# Patient Record
Sex: MALE | Race: WHITE | NOT HISPANIC OR LATINO | Employment: FULL TIME | URBAN - METROPOLITAN AREA
[De-identification: names, ages, dates, MRNs, and addresses within clinical notes are randomized per-mention and may not be internally consistent; named-entity substitution may affect disease eponyms.]

---

## 2017-03-28 ENCOUNTER — GENERIC CONVERSION - ENCOUNTER (OUTPATIENT)
Dept: OTHER | Facility: OTHER | Age: 32
End: 2017-03-28

## 2017-03-30 ENCOUNTER — ALLSCRIPTS OFFICE VISIT (OUTPATIENT)
Dept: OTHER | Facility: OTHER | Age: 32
End: 2017-03-30

## 2017-12-05 ENCOUNTER — ALLSCRIPTS OFFICE VISIT (OUTPATIENT)
Dept: OTHER | Facility: OTHER | Age: 32
End: 2017-12-05

## 2017-12-12 NOTE — PROGRESS NOTES
Assessment    1  Acute upper respiratory infection (465 9) (J06 9)   2  Depression (311) (F32 9)    Plan  Acute upper respiratory infection    · Zithromax Z-Mickey 250 MG Oral Tablet (Azithromycin); TAKE 2 TABLETS ON DAY 1THEN TAKE 1 TABLET A DAY FOR 4 DAYS  Depression    · Escitalopram Oxalate 20 MG Oral Tablet   · Venlafaxine HCl ER 75 MG Oral Tablet Extended Release 24 Hour; ONE TABDAILY    Discussion/Summary    Start effexor for depressionantibiotics for uriof fluids and rest       Chief Complaint  Patient presents today for a cough and congestion  History of Present Illness  HPI: since last thursday with cough with dark brown phlegmcongestion         Review of Systems   Constitutional: not feeling poorly-- and-- not feeling tired  ENT: nasal discharge, but-- no earache,-- no nosebleeds-- and-- no hearing loss  Cardiovascular: the heart rate was not slow,-- no chest pain,-- the heart rate was not fast-- and-- no palpitations  Respiratory: no shortness of breath,-- no cough,-- no wheezing-- and-- no shortness of breath during exertion  Gastrointestinal: no abdominal pain,-- no nausea,-- no constipation-- and-- no diarrhea  Genitourinary: no urinary hesitancy  Musculoskeletal: no arthralgias,-- no joint swelling,-- no myalgias-- and-- no joint stiffness  Active Problems  1  Abscess of groin, left (682 2) (L02 214)   2  Acute streptococcal pharyngitis (034 0) (J02 0)   3  Acute upper respiratory infection (465 9) (J06 9)   4  Depression (311) (F32 9)   5  Fatigue (780 79) (R53 83)   6  Hyperlipidemia (272 4) (E78 5)   7  Need for prophylactic vaccination and inoculation against influenza (V04 81) (Z23)   8  Palpitations (785 1) (R00 2)   9  Seasonal allergies (477 9) (J30 2)   10  Seborrheic dermatitis (690 10) (L21 9)   11  Snoring (786 09) (R06 83)   12  STD exposure (V01 6) (Z20 2)   13  Tachycardia (785 0) (R00 0)    Past Medical History  Active Problems And Past Medical History Reviewed:    The active problems and past medical history were reviewed and updated today  Surgical History  Surgical History Reviewed: The surgical history was reviewed and updated today  Social History     · Being A Social Drinker   · Denied: History of Drug Use   · Denied: Exercising Regularly   · Non-smoker (V49 89) (Z78 9)  The social history was reviewed and updated today  The social history was reviewed and is unchanged  Family History  Family History Reviewed: The family history was reviewed and updated today  Current Meds   1  Escitalopram Oxalate 20 MG Oral Tablet; TAKE 1 TABLET DAILY; Therapy: 97XFY3512 to (Evaluate:25Jan2018)  Requested for: 27Oct2017; Last Rx:27Oct2017 Ordered   2  Fexofenadine HCl - 180 MG Oral Tablet; TAKE 1 TABLET DAILY AS NEEDED FOR ALLERGIES; Therapy: 90PRZ1587 to (Evaluate:19Jan2017)  Requested for: 89JME4236; Last Rx:59Hzd3250 Ordered   3  Flonase Allergy Relief 50 MCG/ACT Nasal Suspension; 2 SPRAYS NASALLY AT BEDTIME AS NEEDED; Therapy: 26XIU8911 to (Evaluate:21Oct2016)  Requested for: 95PPS5727; Last Rx:80Dye3375 Ordered   4  Ketoconazole 2 % External Shampoo; APPLY AS DIRECTED; Therapy: 77KLJ0307 to (Jaquan North)  Requested for: 70ZPK2388; Last Rx:13Pya0181 Ordered   5  Venlafaxine HCl ER 37 5 MG Oral Tablet Extended Release 24 Hour; Therapy: 16NUK0732 to Recorded    The medication list was reviewed and updated today  Allergies  1  Bactrim DS TABS  2  No Known Environmental Allergies   3  No Known Food Allergies    Vitals   Recorded: 40FYS6221 11:21AM   Heart Rate 88   Respiration 18   Systolic 046   Diastolic 70   Height 5 ft 8 5 in   Weight 168 lb 0 2 oz   BMI Calculated 25 17   BSA Calculated 1 91   O2 Saturation 99       Physical Exam   Constitutional  General appearance: No acute distress, well appearing and well nourished  Eyes  Conjunctiva and lids: No swelling, erythema, or discharge  Pupils and irises: Equal, round and reactive to light  Ears, Nose, Mouth, and Throat  External inspection of ears and nose: Normal    Otoscopic examination: Tympanic membrance translucent with normal light reflex  Canals patent without erythema  Nasal mucosa, septum, and turbinates: Normal without edema or erythema  Oropharynx: Normal with no erythema, edema, exudate or lesions  Pulmonary  Respiratory effort: No increased work of breathing or signs of respiratory distress  Auscultation of lungs: Clear to auscultation, equal breath sounds bilaterally, no wheezes, no rales, no rhonci  Cardiovascular  Palpation of heart: Normal PMI, no thrills  Auscultation of heart: Normal rate and rhythm, normal S1 and S2, without murmurs  Examination of extremities for edema and/or varicosities: Normal    Carotid pulses: Normal    Abdomen  Abdomen: Non-tender, no masses  Liver and spleen: No hepatomegaly or splenomegaly  Lymphatic  Palpation of lymph nodes in neck: No lymphadenopathy  Musculoskeletal  Gait and station: Normal    Digits and nails: Normal without clubbing or cyanosis  Inspection/palpation of joints, bones, and muscles: Normal    Skin  Skin and subcutaneous tissue: Normal without rashes or lesions  Neurologic  Cranial nerves: Cranial nerves 2-12 intact  Reflexes: 2+ and symmetric  Sensation: No sensory loss  Psychiatric  Orientation to person, place and time: Normal    Mood and affect: Normal          Results/Data  PHQ-9 Adult Depression Screening 35CKY5198 11:20AM User, s     Test Name Result Flag Reference   PHQ-9 Adult Depression Score 4       Over the last two weeks, how often have you been bothered by any of the following problems?  Little interest or pleasure in doing things: Not at all - 0 Feeling down, depressed, or hopeless: Several days - 1 Trouble falling or staying asleep, or sleeping too much: Not at all - 0 Feeling tired or having little energy: Several days - 1 Poor appetite or over eating: Not at all - 0 Feeling bad about yourself - or that you are a failure or have let yourself or your family down: Not at all - 0 Trouble concentrating on things, such as reading the newspaper or watching television: Several days - 1 Moving or speaking so slowly that other people could have noticed  Or the opposite -  being so fidgety or restless that you have been moving around a lot more than usual: Not at all - 0 Thoughts that you would be better off dead, or of hurting yourself in some way: Several days - 1   PHQ-9 Adult Depression Screening Negative     PHQ-9 Difficulty Level Somewhat difficult     PHQ-9 Severity Minimal Depression           Message  Return to work or school:   Sapphire Russell is under my professional care  He was seen in my office on 12/5/17   He is able to return to work on  12/6/17      [employer]        Signatures   Electronically signed by : Deandra Durand; Dec 11 2017  9:33AM EST                       (Author)    Electronically signed by : MK Ross ; Dec 11 2017 11:07AM EST                       (Review)

## 2018-01-10 NOTE — PROGRESS NOTES
Assessment    1  Encounter for preventive health examination (V70 0) (Z00 00)   2  STD exposure (V01 6) (Z20 2)   3  Hyperlipidemia (272 4) (E78 5)     Assessment and plan #1 annual wellness examination completed per patient overall the patient is clinically stable and doing very well we encouraged the patient Griselda Freud healthy balanced diet, I would like the patient exercises routinely  I will be ordering a fasting operative metabolic panel and lipid panel  #2 history of hyperlipidemia we'll check lipid profile  #3 STD exposure will check acute hepatitis panel, urine chlamydia/gonorrhea DNA, HIV for generation combination and RPR  RTO 3 months call if any problems  Plan  Hyperlipidemia    · (1) COMPREHENSIVE METABOLIC PANEL; Status:Active; Requested for:12May2016;    · (1) LIPID PANEL, FASTING; Status:Active; Requested for:12May2016;   STD exposure    · (1) ACUTE HEPATITIS PANEL; Status:Active; Requested for:12May2016;    · (1) CHLAMYDIA/GC AMPLIFIED DNA, PCR; Source:Urine, Unspecified Source;  Status:Active; Requested for:12May2016;    · (1) HIV AG/AB COMBO, 4TH GEN; [Do Not Release]; Status:Active; Requested  for:12May2016;    · (1) RPR; Status:Active; Requested for:12May2016; History of Present Illness  HM, Adult Male: The patient is being seen for a health maintenance evaluation  Social History: He is unmarried  Work status: working full time and occupation:   The patient has never smoked cigarettes  He reports occasional alcohol use and drinking 1 time a week drinks per week  The patient has no concerns about alcohol abuse  He has never used illicit drugs  General Health: The patient's health since the last visit is described as good  He has regular dental visits  The patient brushes 1-2 time(s) a day, flosses 1 time(s) a day and reports his last dental visit was 12/2015  He complains of vision problems   Vision care includes wearing glasses, wearing reading glasses, an eye examination more than a year ago and 7/2015  He denies hearing loss  Hearing is normal  Immunizations status: up to date  Lifestyle:  He consumes a diverse and healthy diet  Dietary details include 1-2 servings of fruit per day, 1 servings of vegetables per day, 1 servings of meat per day, 0 servings of whole grains per day, 32 ounces of water per day, 1 servings of dairy foods per day, 1 cups of coffee per day, 1 cups of tea per day and 1 a week cans of regular soda per day  He does not have any weight concerns  He does not exercise regularly  He does not use tobacco  The patient has never smoked cigarettes, has never used smokeless tobacco, has never smoked a pipe and has never smoked cigars  He consumes alcohol  He reports occasional alcohol use and drinking 1-2 drinks per week  He typically drinks wine and hard liquor, but no beer consumption  Alcohol concern: The patient has no concerns about alcohol abuse  He denies drug use  He has never used illicit drugs  Reproductive health:  the patient is sexually active  He denies erectile dysfunction  Screening:     Safety elements used: seat belt, safe driving habits, sunscreen and smoke detector, but no carbon monoxide detector and no CPR training for the patient  Risk findings: sees a therapist several times per, but no passive smoke exposure, no guns at home, no anxiety symptoms, no depression symptoms, no travel to developing areas and no tuberculosis exposure  HPI: The patient does report to me that he is homosexual and he has had several partners      Active Problems    1  Abscess of groin, left (682 2) (L02 214)   2  Acute streptococcal pharyngitis (034 0) (J02 0)   3  Acute upper respiratory infection (465 9) (J06 9)   4  Fatigue (780 79) (R53 83)   5  Hyperlipidemia (272 4) (E78 5)   6  Need for prophylactic vaccination and inoculation against influenza (V04 81) (Z23)   7  Palpitations (785 1) (R00 2)   8  Seborrheic dermatitis (690 10) (L21 9)   9   Snoring (786 09) (R06 83)   10  Tachycardia (785 0) (R00 0)    Surgical History    · History of Ear Surgery    Family History  Father    · Family history of Diabetes Mellitus (V18 0)  Maternal Grandfather    · Family history of Colon Cancer (V16 0)  Paternal Grandfather    · Family history of Coronary Artery Disease (V17 49)    Social History    · Being A Social Drinker   · Denied: History of Drug Use   · Denied: Exercising Regularly   · Non-smoker (V49 89) (Z78 9)    Current Meds   1  Amoxicillin 500 MG Oral Capsule; TAKE 1 CAPSULE 3 TIMES DAILY UNTIL GONE;   Therapy: 61BQG1617 to (Shirlene January)  Requested for: 48WNZ5691; Last   Rx:18Mar2016 Ordered   2  Ketoconazole 2 % External Shampoo; APPLY AS DIRECTED; Therapy: 92BSH3480 to (Evaluate:09Jan2013)  Requested for: 41Uou8685; Last   Rx:51Syv2551 Ordered    Allergies    1  Bactrim DS TABS    2  No Known Environmental Allergies   3  No Known Food Allergies    Vitals   Recorded: 82HZT2510 05:09PM   Heart Rate 78   Respiration 16   Systolic 719, RUE, Sitting   Diastolic 72, RUE, Sitting   Height 5 ft 8 in   Weight 164 lb 0 8 oz   BMI Calculated 24 94   BSA Calculated 1 88     Physical Exam    Constitutional   General appearance: No acute distress, well appearing and well nourished  Head and Face   Head and face: Normal     Eyes   Conjunctiva and lids: No erythema, swelling or discharge  Pupils and irises: Equal, round, reactive to light  Ophthalmoscopic examination: Normal fundi and optic discs  Ears, Nose, Mouth, and Throat   External inspection of ears and nose: Normal     Otoscopic examination: Tympanic membranes translucent with normal light reflex  Canals patent without erythema  Hearing: Normal     Nasal mucosa, septum, and turbinates: Normal without edema or erythema  Lips, teeth, and gums: Normal, good dentition  Oropharynx: Normal with no erythema, edema, exudate or lesions  Neck   Neck: Supple, symmetric, trachea midline, no masses  Thyroid: Normal, no thyromegaly  Pulmonary   Respiratory effort: No increased work of breathing or signs of respiratory distress  Auscultation of lungs: Clear to auscultation  Cardiovascular   Auscultation of heart: Normal rate and rhythm, normal S1 and S2, no murmurs  Abdominal aorta: Normal     Pedal pulses: 2+ bilaterally  Examination of extremities for edema and/or varicosities: Normal     Abdomen   Abdomen: Non-tender, no masses  Liver and spleen: No hepatomegaly or splenomegaly  Lymphatic   Palpation of lymph nodes in neck: No lymphadenopathy      Psychiatric   Mood and affect: Normal        Future Appointments    Date/Time Provider Specialty Site   07/18/2016 02:30 PM Dung Slaughter DO [facility]     Signatures   Electronically signed by : Mireille Cano DO; May 15 2016  9:04AM EST                       (Author)

## 2018-01-12 NOTE — RESULT NOTES
Message   notify the patient the bacterial culture came back positive for Escherichia coli-what antibiotic did the general surgeon start the patient on; please let me know        Verified Results  (LC) Aerobic Bacterial Culture [id number] 12:00AM Martha Jasso     Test Name Result Flag Reference   Aerobic Bacterial Culture Final report A    Result 1 Comment A    Escherichia coli, identified by an automated biochemical system    Moderate growth   Result 2 Skin cade isolated     Scant growth   Antimicrobial Susceptibility Comment     ** S = Susceptible; I = Intermediate; R = Resistant **                     P = Positive; N = Negative              MICS are expressed in micrograms per mL     Antibiotic                 RSLT#1    RSLT#2    RSLT#3    RSLT#4  Amoxicillin/Clavulanic Acid    S  Ampicillin                     S  Cefepime                       S  Ceftriaxone                    S  Cefuroxime                     S  Ciprofloxacin                  S  Ertapenem                      S  Gentamicin                     S  Imipenem                       S  Levofloxacin                   S  Piperacillin                   S  Tetracycline                   S  Tobramycin                     S  Trimethoprim/Sulfa             S       Signatures   Electronically signed by : Laya Maldonado DO; Mar 15 2016  5:33PM EST                       (Author)

## 2018-01-14 VITALS
DIASTOLIC BLOOD PRESSURE: 70 MMHG | OXYGEN SATURATION: 98 % | WEIGHT: 161 LBS | BODY MASS INDEX: 23.85 KG/M2 | SYSTOLIC BLOOD PRESSURE: 118 MMHG | HEART RATE: 64 BPM | HEIGHT: 69 IN

## 2018-01-15 NOTE — RESULT NOTES
Message   notify the patient normal cholesterol level follow-up as scheduled        Verified Results  (1) LIPID PANEL, FASTING [id number] 02:55PM Linden Franco     Test Name Result Flag Reference   Cholesterol, Total 193 mg/dL  100-199   Triglycerides 62 mg/dL  0-149   HDL Cholesterol 58 mg/dL  >39   VLDL Cholesterol Kian 12 mg/dL  5-40   LDL Cholesterol Calc 123 mg/dL H 0-99   T  Chol/HDL Ratio 3 3 ratio units  0 0-5 0   T  Chol/HDL Ratio                                                             Men  Women                                               1/2 Avg  Risk  3 4    3 3                                                   Avg Risk  5 0    4 4                                                2X Avg  Risk  9 6    7 1                                                3X Avg  Risk 23 4   11 0       Signatures   Electronically signed by : Shruti Be DO; Mar 28 2017  7:00PM EST                       (Author)

## 2018-01-18 NOTE — RESULT NOTES
Message   Notify the patient urine chlamydia and gonorrhea test was negative follow-up as scheduled        Verified Results  ([facility] [facility]) [facility] Amplification 52Uiw1701 10:40AM Rylee Kyle     Test Name Result Flag Reference   Chlamydia trachomatis, REFUGIO Negative  Negative   Neisseria gonorrhoeae, REFUGIO Negative  Negative       Signatures   Electronically signed by : Taylor Jordan DO; Sep 25 2016  9:37AM EST                       (Author)

## 2018-01-23 VITALS
RESPIRATION RATE: 18 BRPM | BODY MASS INDEX: 24.88 KG/M2 | HEART RATE: 88 BPM | HEIGHT: 69 IN | DIASTOLIC BLOOD PRESSURE: 70 MMHG | OXYGEN SATURATION: 99 % | WEIGHT: 168.01 LBS | SYSTOLIC BLOOD PRESSURE: 120 MMHG

## 2018-01-23 NOTE — MISCELLANEOUS
Message  Return to work or school:   Surekha Bates is under my professional care  He was seen in my office on 12/5/17   He is able to return to work on  12/6/17       [facility]  Past Medical History  Active Problems And Past Medical History Reviewed: The active problems and past medical history were reviewed and updated today  Family History  Family History Reviewed: The family history was reviewed and updated today  Surgical History  Surgical History Reviewed: The surgical history was reviewed and updated today  Signatures   Electronically signed by : Elías Hernandez;  Dec  5 2017 11:32AM EST                       (Author)    Electronically signed by : Elías Hernandez; Dec 11 2017  9:33AM EST                       (Author)    Electronically signed by : MK Patel ; Dec 11 2017 11:07AM EST                       (Review)

## 2018-03-15 DIAGNOSIS — F41.9 ANXIETY: Primary | ICD-10-CM

## 2018-03-15 RX ORDER — VENLAFAXINE HYDROCHLORIDE 75 MG/1
75 CAPSULE, EXTENDED RELEASE ORAL DAILY
Qty: 30 CAPSULE | Refills: 0 | Status: SHIPPED | OUTPATIENT
Start: 2018-03-15 | End: 2018-04-13 | Stop reason: SDUPTHER

## 2018-04-13 DIAGNOSIS — F41.9 ANXIETY: ICD-10-CM

## 2018-04-13 RX ORDER — VENLAFAXINE HYDROCHLORIDE 75 MG/1
CAPSULE, EXTENDED RELEASE ORAL
Qty: 30 CAPSULE | Refills: 0 | Status: SHIPPED | OUTPATIENT
Start: 2018-04-13 | End: 2018-05-28 | Stop reason: SDUPTHER

## 2018-05-28 DIAGNOSIS — F41.9 ANXIETY: ICD-10-CM

## 2018-05-29 RX ORDER — VENLAFAXINE HYDROCHLORIDE 75 MG/1
75 CAPSULE, EXTENDED RELEASE ORAL DAILY
Qty: 30 CAPSULE | Refills: 5 | Status: SHIPPED | OUTPATIENT
Start: 2018-05-29 | End: 2018-12-10 | Stop reason: SDUPTHER

## 2018-05-29 NOTE — TELEPHONE ENCOUNTER
From: Luis Padron  Sent: 5/28/2018 11:14 AM EDT  Subject: Medication Renewal Request    Luis Nereida would like a refill of the following medications:     venlafaxine (EFFEXOR-XR) 75 mg 24 hr capsule BRANDON Savage]    Preferred pharmacy: [facility] #[id number]

## 2018-12-10 DIAGNOSIS — F41.9 ANXIETY: ICD-10-CM

## 2018-12-10 RX ORDER — VENLAFAXINE HYDROCHLORIDE 75 MG/1
CAPSULE, EXTENDED RELEASE ORAL
Qty: 30 CAPSULE | Refills: 5 | Status: SHIPPED | OUTPATIENT
Start: 2018-12-10 | End: 2018-12-19

## 2018-12-19 ENCOUNTER — OFFICE VISIT (OUTPATIENT)
Dept: INTERNAL MEDICINE CLINIC | Facility: CLINIC | Age: 33
End: 2018-12-19
Payer: COMMERCIAL

## 2018-12-19 VITALS
DIASTOLIC BLOOD PRESSURE: 92 MMHG | TEMPERATURE: 98.2 F | SYSTOLIC BLOOD PRESSURE: 130 MMHG | WEIGHT: 174.2 LBS | BODY MASS INDEX: 26.1 KG/M2

## 2018-12-19 DIAGNOSIS — R21 RASH: ICD-10-CM

## 2018-12-19 DIAGNOSIS — F41.9 ANXIETY: Primary | ICD-10-CM

## 2018-12-19 PROCEDURE — 99214 OFFICE O/P EST MOD 30 MIN: CPT | Performed by: NURSE PRACTITIONER

## 2018-12-19 RX ORDER — VENLAFAXINE HYDROCHLORIDE 150 MG/1
150 CAPSULE, EXTENDED RELEASE ORAL DAILY
Qty: 30 CAPSULE | Refills: 2 | Status: SHIPPED | OUTPATIENT
Start: 2018-12-19 | End: 2019-06-25 | Stop reason: SDUPTHER

## 2018-12-19 RX ORDER — KETOCONAZOLE 20 MG/ML
SHAMPOO TOPICAL
COMMUNITY
Start: 2012-12-10 | End: 2018-12-19 | Stop reason: SDUPTHER

## 2018-12-19 RX ORDER — KETOCONAZOLE 20 MG/ML
1 SHAMPOO TOPICAL 2 TIMES WEEKLY
Qty: 120 ML | Refills: 0 | Status: SHIPPED | OUTPATIENT
Start: 2018-12-20 | End: 2021-05-05 | Stop reason: SDUPTHER

## 2018-12-19 NOTE — PROGRESS NOTES
Assessment/Plan:    No problem-specific Assessment & Plan notes found for this encounter  Diagnoses and all orders for this visit:    Anxiety  -     venlafaxine (EFFEXOR-XR) 150 mg 24 hr capsule; Take 1 capsule (150 mg total) by mouth daily    Rash  -     ketoconazole (NIZORAL) 2 % shampoo; Apply 1 application topically 2 (two) times a week    Other orders  -     Cancel: PREFERRED: influenza vaccine, [id number], quadrivalent, recombinant, PF, 0 5 mL (FLUBLOK)  -     Discontinue: ketoconazole (NIZORAL) 2 % shampoo; Apply topically        Asked patient to call if he is not improving or experiencing any side effects  He verbalized understanding  RTO in 1-2 months    He deferred having flu shot      Subjective:      Patient ID: Kaiser Emerson is a 26 y o  male  Patient is here for follow up of anxiety  He feels ok overall  No panic attacks or feelings of sadness, however, lacks motivation and feels "blah" most of the time  Has been on 75mg of effexor less than a year    Has not had any major side effects  No weight gain    He also has facial dermatitis and uses ketoconazole wash for this        The following portions of the patient's history were reviewed and updated as appropriate: allergies, current medications, past family history, past medical history, past social history, past surgical history and problem list     Review of Systems   Constitutional: Negative  HENT: Negative  Eyes: Negative  Respiratory: Negative  Cardiovascular: Negative  Gastrointestinal: Negative  Musculoskeletal: Negative  Skin: Positive for rash  Neurological: Negative  Family History   Problem Relation Age of Onset    Diabetes Father     Colon cancer Maternal Grandfather     Coronary artery disease Paternal Grandfather      No past medical history on file    Social History     Social History    Marital status: Single     Spouse name: N/A    Number of children: N/A    Years of education: N/A Occupational History    Not on file  Social History Main Topics    Smoking status: Never Smoker    Smokeless tobacco: Not on file    Alcohol use Yes      Comment: Social use    Drug use: No    Sexual activity: Not on file     Other Topics Concern    Not on file     Social History Narrative    No narrative on file       Current Outpatient Prescriptions:     [START ON 12/20/2018] ketoconazole (NIZORAL) 2 % shampoo, Apply 1 application topically 2 (two) times a week, Disp: 120 mL, Rfl: 0    venlafaxine (EFFEXOR-XR) 150 mg 24 hr capsule, Take 1 capsule (150 mg total) by mouth daily, Disp: 30 capsule, Rfl: 2  Allergies   Allergen Reactions    Sulfamethoxazole-Trimethoprim Vomiting         Objective:      /92   Temp 98 2 °F (36 8 °C)   Wt 79 kg (174 lb 3 2 oz)   BMI 26 10 kg/m²          Physical Exam   Constitutional: He is oriented to person, place, and time  He appears well-developed and well-nourished  HENT:   Head: Normocephalic and atraumatic  Eyes: Pupils are equal, round, and reactive to light  Conjunctivae are normal    Neck: Normal range of motion  Neck supple  Cardiovascular: Normal rate and regular rhythm  Pulmonary/Chest: Effort normal and breath sounds normal    Abdominal: Soft  Bowel sounds are normal    Musculoskeletal: Normal range of motion  Neurological: He is alert and oriented to person, place, and time  Skin: Skin is warm and dry

## 2019-03-04 ENCOUNTER — OFFICE VISIT (OUTPATIENT)
Dept: INTERNAL MEDICINE CLINIC | Facility: CLINIC | Age: 34
End: 2019-03-04
Payer: COMMERCIAL

## 2019-03-04 VITALS
TEMPERATURE: 99.7 F | HEIGHT: 68 IN | RESPIRATION RATE: 14 BRPM | OXYGEN SATURATION: 98 % | HEART RATE: 83 BPM | WEIGHT: 179.2 LBS | BODY MASS INDEX: 27.16 KG/M2 | DIASTOLIC BLOOD PRESSURE: 70 MMHG | SYSTOLIC BLOOD PRESSURE: 116 MMHG

## 2019-03-04 DIAGNOSIS — R21 RASH: Primary | ICD-10-CM

## 2019-03-04 PROCEDURE — 1036F TOBACCO NON-USER: CPT | Performed by: NURSE PRACTITIONER

## 2019-03-04 PROCEDURE — 99213 OFFICE O/P EST LOW 20 MIN: CPT | Performed by: NURSE PRACTITIONER

## 2019-03-04 PROCEDURE — 3008F BODY MASS INDEX DOCD: CPT | Performed by: NURSE PRACTITIONER

## 2019-03-04 RX ORDER — DESONIDE 0.5 MG/ML
LOTION TOPICAL 2 TIMES DAILY
Qty: 59 ML | Refills: 0 | Status: SHIPPED | OUTPATIENT
Start: 2019-03-04

## 2019-03-04 NOTE — PROGRESS NOTES
Assessment/Plan:    Rash  Mostly likely pityriasis rosacea  See derm if not improving  Try steroid cream       Diagnoses and all orders for this visit:    Rash  -     desonide (DESOWEN) 0 05 % lotion; Apply topically 2 (two) times a day    Other orders  -     Cancel: PREFERRED: influenza vaccine, [id number], quadrivalent, recombinant, PF, 0 5 mL (FLUBLOK)          Subjective:      Patient ID: Neela Ward is a 26 y o  male  Patient co red spotty rash that started on his abdomen and spread to his arms  nonitchy    Raised spots  Doesn't bother him at all  It has been 1 week  No changes in food or medication      The following portions of the patient's history were reviewed and updated as appropriate: allergies, current medications, past family history, past medical history, past social history, past surgical history and problem list     Review of Systems   Constitutional: Negative  HENT: Negative  Eyes: Negative  Respiratory: Negative  Cardiovascular: Negative  Gastrointestinal: Negative  Musculoskeletal: Negative  Neurological: Negative  Objective:      /70   Pulse 83   Temp 99 7 °F (37 6 °C) (Oral)   Resp 14   Ht 5' 8" (1 727 m)   Wt 81 3 kg (179 lb 3 2 oz)   SpO2 98%   BMI 27 25 kg/m²          Physical Exam   Constitutional: He is oriented to person, place, and time  He appears well-developed and well-nourished  HENT:   Head: Normocephalic and atraumatic  Neck: Normal range of motion  Neck supple  Abdominal: Soft  Bowel sounds are normal    Musculoskeletal: Normal range of motion  Neurological: He is alert and oriented to person, place, and time  Skin: Rash noted     Trunk - red spotty rash and some spreading to his arms

## 2019-03-04 NOTE — PATIENT INSTRUCTIONS
Pityriasis rosacea- try a topical steroid that was prescribed to you  Call your dermatologist for an appointment

## 2019-03-05 PROBLEM — R21 RASH: Status: ACTIVE | Noted: 2019-03-05

## 2019-06-25 DIAGNOSIS — F41.9 ANXIETY: ICD-10-CM

## 2019-06-27 RX ORDER — VENLAFAXINE HYDROCHLORIDE 150 MG/1
150 CAPSULE, EXTENDED RELEASE ORAL DAILY
Qty: 30 CAPSULE | Refills: 3 | Status: SHIPPED | OUTPATIENT
Start: 2019-06-27 | End: 2019-08-08 | Stop reason: SDUPTHER

## 2019-08-08 ENCOUNTER — OFFICE VISIT (OUTPATIENT)
Dept: INTERNAL MEDICINE CLINIC | Facility: CLINIC | Age: 34
End: 2019-08-08
Payer: COMMERCIAL

## 2019-08-08 VITALS
DIASTOLIC BLOOD PRESSURE: 96 MMHG | WEIGHT: 182.8 LBS | SYSTOLIC BLOOD PRESSURE: 134 MMHG | HEIGHT: 68 IN | BODY MASS INDEX: 27.71 KG/M2 | OXYGEN SATURATION: 99 % | HEART RATE: 86 BPM | TEMPERATURE: 99.3 F | RESPIRATION RATE: 16 BRPM

## 2019-08-08 DIAGNOSIS — J02.9 ACUTE PHARYNGITIS, UNSPECIFIED ETIOLOGY: Primary | ICD-10-CM

## 2019-08-08 PROCEDURE — 3008F BODY MASS INDEX DOCD: CPT | Performed by: NURSE PRACTITIONER

## 2019-08-08 PROCEDURE — 99213 OFFICE O/P EST LOW 20 MIN: CPT | Performed by: NURSE PRACTITIONER

## 2019-08-08 PROCEDURE — 1036F TOBACCO NON-USER: CPT | Performed by: NURSE PRACTITIONER

## 2019-08-08 RX ORDER — AMOXICILLIN AND CLAVULANATE POTASSIUM 875; 125 MG/1; MG/1
1 TABLET, FILM COATED ORAL EVERY 12 HOURS SCHEDULED
Qty: 14 TABLET | Refills: 0 | Status: SHIPPED | OUTPATIENT
Start: 2019-08-08 | End: 2019-08-15

## 2019-08-08 RX ORDER — VENLAFAXINE HYDROCHLORIDE 75 MG/1
150 CAPSULE, EXTENDED RELEASE ORAL DAILY
COMMUNITY
Start: 2019-07-23 | End: 2021-08-16 | Stop reason: ALTCHOICE

## 2019-08-08 NOTE — PROGRESS NOTES
Assessment/Plan:    Acute pharyngitis  Start antibiotics       Diagnoses and all orders for this visit:    Acute pharyngitis, unspecified etiology  -     amoxicillin-clavulanate (AUGMENTIN) 875-125 mg per tablet; Take 1 tablet by mouth every 12 (twelve) hours for 7 days    Other orders  -     venlafaxine (EFFEXOR-XR) 75 mg 24 hr capsule; 150 mg daily           Subjective:      Patient ID: Sharon Sherwood is a 37 y o  male  Sore Throat    This is a new problem  The current episode started in the past 7 days  The problem has been unchanged  There has been no fever  The pain is mild  Associated symptoms include congestion  The following portions of the patient's history were reviewed and updated as appropriate: allergies, current medications, past family history, past medical history, past social history, past surgical history and problem list     Review of Systems   Constitutional: Negative  HENT: Positive for congestion and sore throat  Eyes: Negative  Respiratory: Negative  Cardiovascular: Negative  Gastrointestinal: Negative  Musculoskeletal: Negative  Neurological: Negative  Objective:      /96 (BP Location: Left arm, Patient Position: Sitting, Cuff Size: Large)   Pulse 86   Temp 99 3 °F (37 4 °C) (Oral)   Resp 16   Ht 5' 8" (1 727 m)   Wt 82 9 kg (182 lb 12 8 oz)   SpO2 99%   BMI 27 79 kg/m²          Physical Exam   Constitutional: He is oriented to person, place, and time  He appears well-developed and well-nourished  HENT:   Head: Normocephalic and atraumatic  Right Ear: External ear normal    Left Ear: External ear normal    Nose: Nose normal    Mouth/Throat: Posterior oropharyngeal erythema present  Eyes: Pupils are equal, round, and reactive to light  Conjunctivae are normal    Neck: Normal range of motion  Neck supple  Cardiovascular: Normal rate and regular rhythm  Pulmonary/Chest: Effort normal and breath sounds normal    Abdominal: Soft   Bowel sounds are normal    Musculoskeletal: Normal range of motion  Neurological: He is alert and oriented to person, place, and time  Skin: Skin is warm and dry  Nursing note and vitals reviewed

## 2019-10-12 DIAGNOSIS — F41.9 ANXIETY: ICD-10-CM

## 2019-10-14 RX ORDER — VENLAFAXINE HYDROCHLORIDE 75 MG/1
CAPSULE, EXTENDED RELEASE ORAL
Qty: 30 CAPSULE | Refills: 5 | Status: SHIPPED | OUTPATIENT
Start: 2019-10-14 | End: 2021-08-16 | Stop reason: ALTCHOICE

## 2020-02-09 DIAGNOSIS — F41.9 ANXIETY: Primary | ICD-10-CM

## 2020-02-09 RX ORDER — VENLAFAXINE HYDROCHLORIDE 150 MG/1
CAPSULE, EXTENDED RELEASE ORAL
Qty: 30 CAPSULE | Refills: 1 | Status: SHIPPED | OUTPATIENT
Start: 2020-02-09 | End: 2020-05-08

## 2020-05-03 DIAGNOSIS — F41.9 ANXIETY: ICD-10-CM

## 2020-05-08 RX ORDER — VENLAFAXINE HYDROCHLORIDE 150 MG/1
CAPSULE, EXTENDED RELEASE ORAL
Qty: 30 CAPSULE | Refills: 1 | Status: SHIPPED | OUTPATIENT
Start: 2020-05-08 | End: 2020-07-27

## 2020-07-26 DIAGNOSIS — F41.9 ANXIETY: ICD-10-CM

## 2020-07-27 RX ORDER — VENLAFAXINE HYDROCHLORIDE 150 MG/1
CAPSULE, EXTENDED RELEASE ORAL
Qty: 30 CAPSULE | Refills: 1 | Status: SHIPPED | OUTPATIENT
Start: 2020-07-27 | End: 2020-10-20

## 2020-10-15 DIAGNOSIS — F41.9 ANXIETY: ICD-10-CM

## 2020-10-20 RX ORDER — VENLAFAXINE HYDROCHLORIDE 150 MG/1
CAPSULE, EXTENDED RELEASE ORAL
Qty: 30 CAPSULE | Refills: 0 | Status: SHIPPED | OUTPATIENT
Start: 2020-10-20 | End: 2020-12-17

## 2020-12-17 DIAGNOSIS — F41.9 ANXIETY: ICD-10-CM

## 2020-12-17 RX ORDER — VENLAFAXINE HYDROCHLORIDE 150 MG/1
CAPSULE, EXTENDED RELEASE ORAL
Qty: 30 CAPSULE | Refills: 0 | Status: SHIPPED | OUTPATIENT
Start: 2020-12-17 | End: 2021-01-11

## 2021-01-11 DIAGNOSIS — F41.9 ANXIETY: ICD-10-CM

## 2021-01-11 RX ORDER — VENLAFAXINE HYDROCHLORIDE 150 MG/1
CAPSULE, EXTENDED RELEASE ORAL
Qty: 30 CAPSULE | Refills: 0 | Status: SHIPPED | OUTPATIENT
Start: 2021-01-11 | End: 2021-02-22

## 2021-02-20 DIAGNOSIS — F41.9 ANXIETY: ICD-10-CM

## 2021-02-22 RX ORDER — VENLAFAXINE HYDROCHLORIDE 150 MG/1
CAPSULE, EXTENDED RELEASE ORAL
Qty: 30 CAPSULE | Refills: 0 | Status: SHIPPED | OUTPATIENT
Start: 2021-02-22 | End: 2021-03-29

## 2021-03-28 DIAGNOSIS — F41.9 ANXIETY: ICD-10-CM

## 2021-03-29 RX ORDER — VENLAFAXINE HYDROCHLORIDE 150 MG/1
CAPSULE, EXTENDED RELEASE ORAL
Qty: 30 CAPSULE | Refills: 0 | Status: SHIPPED | OUTPATIENT
Start: 2021-03-29 | End: 2021-05-04

## 2021-05-01 DIAGNOSIS — F41.9 ANXIETY: ICD-10-CM

## 2021-05-04 RX ORDER — VENLAFAXINE HYDROCHLORIDE 150 MG/1
CAPSULE, EXTENDED RELEASE ORAL
Qty: 30 CAPSULE | Refills: 0 | Status: SHIPPED | OUTPATIENT
Start: 2021-05-04 | End: 2021-06-08

## 2021-05-05 DIAGNOSIS — R21 RASH: ICD-10-CM

## 2021-05-05 RX ORDER — KETOCONAZOLE 20 MG/ML
1 SHAMPOO TOPICAL 2 TIMES WEEKLY
Qty: 120 ML | Refills: 0 | Status: SHIPPED | OUTPATIENT
Start: 2021-05-06 | End: 2021-05-30

## 2021-05-30 DIAGNOSIS — R21 RASH: ICD-10-CM

## 2021-05-30 RX ORDER — KETOCONAZOLE 20 MG/ML
1 SHAMPOO TOPICAL 2 TIMES WEEKLY
Qty: 120 ML | Refills: 0 | Status: SHIPPED | OUTPATIENT
Start: 2021-05-31

## 2021-06-06 DIAGNOSIS — F41.9 ANXIETY: ICD-10-CM

## 2021-06-08 RX ORDER — VENLAFAXINE HYDROCHLORIDE 150 MG/1
CAPSULE, EXTENDED RELEASE ORAL
Qty: 30 CAPSULE | Refills: 0 | Status: SHIPPED | OUTPATIENT
Start: 2021-06-08 | End: 2021-07-08

## 2021-07-08 DIAGNOSIS — F41.9 ANXIETY: ICD-10-CM

## 2021-07-08 RX ORDER — VENLAFAXINE HYDROCHLORIDE 150 MG/1
CAPSULE, EXTENDED RELEASE ORAL
Qty: 30 CAPSULE | Refills: 0 | Status: SHIPPED | OUTPATIENT
Start: 2021-07-08 | End: 2021-08-16 | Stop reason: ALTCHOICE

## 2021-08-16 DIAGNOSIS — F41.9 ANXIETY: ICD-10-CM

## 2021-08-16 RX ORDER — VENLAFAXINE HYDROCHLORIDE 150 MG/1
150 CAPSULE, EXTENDED RELEASE ORAL DAILY
Qty: 30 CAPSULE | Refills: 0 | Status: SHIPPED | OUTPATIENT
Start: 2021-08-16 | End: 2021-08-18

## 2021-08-18 ENCOUNTER — OFFICE VISIT (OUTPATIENT)
Dept: INTERNAL MEDICINE CLINIC | Facility: CLINIC | Age: 36
End: 2021-08-18
Payer: COMMERCIAL

## 2021-08-18 VITALS
HEIGHT: 68 IN | BODY MASS INDEX: 29.73 KG/M2 | DIASTOLIC BLOOD PRESSURE: 78 MMHG | HEART RATE: 89 BPM | RESPIRATION RATE: 16 BRPM | WEIGHT: 196.2 LBS | SYSTOLIC BLOOD PRESSURE: 119 MMHG | OXYGEN SATURATION: 98 %

## 2021-08-18 DIAGNOSIS — Z23 ENCOUNTER FOR IMMUNIZATION: Primary | ICD-10-CM

## 2021-08-18 DIAGNOSIS — Z00.00 WELLNESS EXAMINATION: ICD-10-CM

## 2021-08-18 DIAGNOSIS — Z13.6 SCREENING FOR CARDIOVASCULAR CONDITION: ICD-10-CM

## 2021-08-18 DIAGNOSIS — Z13.1 SCREENING FOR DIABETES MELLITUS (DM): ICD-10-CM

## 2021-08-18 DIAGNOSIS — F41.9 ANXIETY: ICD-10-CM

## 2021-08-18 DIAGNOSIS — F32.A DEPRESSION, UNSPECIFIED DEPRESSION TYPE: ICD-10-CM

## 2021-08-18 DIAGNOSIS — Z12.83 SCREENING FOR SKIN CANCER: ICD-10-CM

## 2021-08-18 PROCEDURE — 3725F SCREEN DEPRESSION PERFORMED: CPT | Performed by: INTERNAL MEDICINE

## 2021-08-18 PROCEDURE — 90715 TDAP VACCINE 7 YRS/> IM: CPT

## 2021-08-18 PROCEDURE — 90471 IMMUNIZATION ADMIN: CPT

## 2021-08-18 PROCEDURE — 3008F BODY MASS INDEX DOCD: CPT | Performed by: INTERNAL MEDICINE

## 2021-08-18 PROCEDURE — 99395 PREV VISIT EST AGE 18-39: CPT | Performed by: INTERNAL MEDICINE

## 2021-08-18 RX ORDER — SERTRALINE HYDROCHLORIDE 25 MG/1
25 TABLET, FILM COATED ORAL DAILY
Qty: 30 TABLET | Refills: 5 | Status: SHIPPED | OUTPATIENT
Start: 2021-08-18 | End: 2021-09-10

## 2021-08-18 NOTE — PROGRESS NOTES
BMI Counseling: Body mass index is 29 83 kg/m²  The BMI is above normal  Nutrition recommendations include decreasing portion sizes and moderation in carbohydrate intake  Exercise recommendations include exercising 3-5 times per week  Assessment/Plan:    Wellness examination   Assessment and plan 1  Health maintenance annual wellness examination overall the patient is clinically stable and doing well, we encouraged the patient to follow a healthy and balanced diet  We recommend that the patient exercise routinely approximately 30 minutes 5 times per week   We have reviewed the patient's vaccines and have made recommendations for updates if necessary    Annual flu shot recommended    We will be ordering screening laboratories which are age appropriate  Return to the office in    1 year   call if any problems  Anxiety   Clinically stable and doing well continue the current medical regiment will continue monitor  No SI will continue low-dose Zoloft 25 mg once daily         Problem List Items Addressed This Visit        Other    Anxiety      Clinically stable and doing well continue the current medical regiment will continue monitor  No SI will continue low-dose Zoloft 25 mg once daily         Relevant Medications    sertraline (ZOLOFT) 25 mg tablet    Wellness examination      Assessment and plan 1  Health maintenance annual wellness examination overall the patient is clinically stable and doing well, we encouraged the patient to follow a healthy and balanced diet  We recommend that the patient exercise routinely approximately 30 minutes 5 times per week   We have reviewed the patient's vaccines and have made recommendations for updates if necessary    Annual flu shot recommended    We will be ordering screening laboratories which are age appropriate  Return to the office in    1 year   call if any problems             Other Visit Diagnoses     Encounter for immunization    -  Primary    Relevant Orders    TDAP VACCINE GREATER THAN OR EQUAL TO 6YO IM (Completed)    Screening for skin cancer        Relevant Orders    Ambulatory referral to Dermatology    Screening for diabetes mellitus (DM)        Relevant Orders    Comprehensive metabolic panel    Hemoglobin A1C    Screening for cardiovascular condition        Relevant Orders    Lipid Panel with Direct LDL reflex    Depression, unspecified depression type        Relevant Medications    sertraline (ZOLOFT) 25 mg tablet           RTO in 1 year call if any problems  Subjective:      Patient ID: Melissa Escudero is a 43 y o  male  HPI  77-year-old male coming in for annual physical examination he drives a car safely and wears a seatbelt, trying to follow healthy/balance diet walking the dog routinely sees a dentist brushes and flosses teeth    Uses sunscreen  Reports me his mood is doing well with low-dose of Zoloft tolerates well  Had seen counselor past at this point time does not require   Reports me would like to have annual skin examination via Dermatology which will be provided  The following portions of the patient's history were reviewed and updated as appropriate: allergies, current medications, past family history, past medical history, past social history, past surgical history and problem list     Review of Systems   Constitutional: Negative for activity change, appetite change and unexpected weight change  HENT: Negative for congestion and postnasal drip  Eyes: Negative for visual disturbance  Respiratory: Negative for cough and shortness of breath  Cardiovascular: Negative for chest pain  Gastrointestinal: Negative for abdominal pain, diarrhea, nausea and vomiting  Neurological: Negative for dizziness, light-headedness and headaches  Psychiatric/Behavioral: Negative for suicidal ideas  The patient is not nervous/anxious  Objective:    No follow-ups on file  No results found        Allergies   Allergen Reactions    Sulfamethoxazole-Trimethoprim Vomiting       History reviewed  No pertinent past medical history  Past Surgical History:   Procedure Laterality Date    TYMPANOSTOMY TUBE PLACEMENT Bilateral      Current Outpatient Medications on File Prior to Visit   Medication Sig Dispense Refill    desonide (DESOWEN) 0 05 % lotion Apply topically 2 (two) times a day (Patient not taking: Reported on 8/8/2019) 59 mL 0    ketoconazole (NIZORAL) 2 % shampoo APPLY 1 APPLICATION TOPICALLY 2 (TWO) TIMES A WEEK 120 mL 0     No current facility-administered medications on file prior to visit  Family History   Problem Relation Age of Onset    Diabetes Father     Colon cancer Maternal Grandfather     Coronary artery disease Paternal Grandfather      Social History     Socioeconomic History    Marital status: Single     Spouse name: Not on file    Number of children: Not on file    Years of education: Not on file    Highest education level: Not on file   Occupational History    Not on file   Tobacco Use    Smoking status: Never Smoker    Smokeless tobacco: Never Used   Substance and Sexual Activity    Alcohol use: Yes     Comment: Social use    Drug use: No    Sexual activity: Not on file   Other Topics Concern    Not on file   Social History Narrative    Not on file     Social Determinants of Health     Financial Resource Strain:     Difficulty of Paying Living Expenses:    Food Insecurity:     Worried About Running Out of Food in the Last Year:     Ran Out of Food in the Last Year:    Transportation Needs:     Lack of Transportation (Medical):      Lack of Transportation (Non-Medical):    Physical Activity:     Days of Exercise per Week:     Minutes of Exercise per Session:    Stress:     Feeling of Stress :    Social Connections:     Frequency of Communication with Friends and Family:     Frequency of Social Gatherings with Friends and Family:     Attends Rastafari Services:     Active Member of Clubs or Organizations:     Attends Club or Organization Meetings:     Marital Status:    Intimate Partner Violence:     Fear of Current or Ex-Partner:     Emotionally Abused:     Physically Abused:     Sexually Abused:      Vitals:    08/18/21 1720   BP: 119/78   Pulse: 89   Resp: 16   SpO2: 98%   Weight: 89 kg (196 lb 3 2 oz)   Height: 5' 8" (1 727 m)     Results for orders placed or performed in visit on 09/21/16   LIPID PANEL (HISTORICAL)   Result Value Ref Range    Cholesterol 193 100 - 199 mg/dL    Triglycerides 62 0 - 149 mg/dL    HDL 58 >39 mg/dL    VLDL Cholesterol Kian 12 5 - 40 mg/dL    LDL Calculated 123 (H) 0 - 99 mg/dL    Chol/HDL Ratio 3 3 0 0 - 5 0 ratio units     Weight (last 2 days)     None        Body mass index is 29 83 kg/m²  BP      Temp      Pulse     Resp      SpO2        Vitals:    08/18/21 1720   Weight: 89 kg (196 lb 3 2 oz)     Vitals:    08/18/21 1720   Weight: 89 kg (196 lb 3 2 oz)       /78   Pulse 89   Resp 16   Ht 5' 8" (1 727 m)   Wt 89 kg (196 lb 3 2 oz)   SpO2 98%   BMI 29 83 kg/m²          Physical Exam  Constitutional:       General: He is not in acute distress  Appearance: He is well-developed  He is not diaphoretic  HENT:      Head: Normocephalic and atraumatic  Right Ear: External ear normal       Left Ear: External ear normal    Eyes:      General: No scleral icterus  Right eye: No discharge  Left eye: No discharge  Conjunctiva/sclera: Conjunctivae normal       Pupils: Pupils are equal, round, and reactive to light  Cardiovascular:      Rate and Rhythm: Normal rate and regular rhythm  Heart sounds: Normal heart sounds  No murmur heard  No friction rub  No gallop  Pulmonary:      Effort: No respiratory distress  Breath sounds: No wheezing or rales  Abdominal:      General: Bowel sounds are normal  There is no distension  Palpations: Abdomen is soft  There is no mass  Tenderness:  There is no abdominal tenderness  There is no guarding or rebound  Musculoskeletal:         General: No deformity  Cervical back: Neck supple  Lymphadenopathy:      Cervical: No cervical adenopathy  Neurological:      Mental Status: He is alert  Psychiatric:         Mood and Affect: Mood is not anxious or depressed  Thought Content: Thought content does not include suicidal ideation

## 2021-08-22 PROBLEM — Z00.00 WELLNESS EXAMINATION: Status: ACTIVE | Noted: 2021-08-22

## 2021-08-22 PROBLEM — F41.9 ANXIETY: Status: ACTIVE | Noted: 2021-08-22

## 2021-08-22 NOTE — ASSESSMENT & PLAN NOTE
Assessment and plan 1  Health maintenance annual wellness examination overall the patient is clinically stable and doing well, we encouraged the patient to follow a healthy and balanced diet  We recommend that the patient exercise routinely approximately 30 minutes 5 times per week   We have reviewed the patient's vaccines and have made recommendations for updates if necessary    Annual flu shot recommended    We will be ordering screening laboratories which are age appropriate  Return to the office in    1 year   call if any problems

## 2021-08-22 NOTE — ASSESSMENT & PLAN NOTE
Clinically stable and doing well continue the current medical regiment will continue monitor   No SI will continue low-dose Zoloft 25 mg once daily

## 2021-09-09 ENCOUNTER — TELEPHONE (OUTPATIENT)
Dept: INTERNAL MEDICINE CLINIC | Facility: CLINIC | Age: 36
End: 2021-09-09

## 2021-09-09 NOTE — TELEPHONE ENCOUNTER
Pt wanted to call in and give an update on the sertraline - has not noticed much of a change on the 25mg, asking if you can send in 50mg to [facility]

## 2021-09-10 DIAGNOSIS — F41.9 ANXIETY: Primary | ICD-10-CM

## 2021-09-10 NOTE — TELEPHONE ENCOUNTER
I called and reviewed with the pt he agrees to the increase    Script sent to Dr Gabriel Agee for approval

## 2021-12-22 DIAGNOSIS — F41.9 ANXIETY: ICD-10-CM

## 2022-01-24 DIAGNOSIS — F41.9 ANXIETY: ICD-10-CM

## 2022-04-12 ENCOUNTER — OFFICE VISIT (OUTPATIENT)
Dept: INTERNAL MEDICINE CLINIC | Facility: CLINIC | Age: 37
End: 2022-04-12
Payer: COMMERCIAL

## 2022-04-12 VITALS
RESPIRATION RATE: 16 BRPM | HEART RATE: 83 BPM | OXYGEN SATURATION: 97 % | HEIGHT: 68 IN | DIASTOLIC BLOOD PRESSURE: 82 MMHG | SYSTOLIC BLOOD PRESSURE: 124 MMHG | BODY MASS INDEX: 29.4 KG/M2 | WEIGHT: 194 LBS

## 2022-04-12 DIAGNOSIS — Z84.0 FAMILY HISTORY OF LUPUS ERYTHEMATOSUS: Primary | ICD-10-CM

## 2022-04-12 DIAGNOSIS — Z13.0 SCREENING, ANEMIA, DEFICIENCY, IRON: ICD-10-CM

## 2022-04-12 DIAGNOSIS — F41.9 ANXIETY: ICD-10-CM

## 2022-04-12 DIAGNOSIS — Z13.6 SCREENING FOR CARDIOVASCULAR CONDITION: ICD-10-CM

## 2022-04-12 DIAGNOSIS — Z13.1 SCREENING FOR DIABETES MELLITUS (DM): ICD-10-CM

## 2022-04-12 PROCEDURE — 99213 OFFICE O/P EST LOW 20 MIN: CPT | Performed by: INTERNAL MEDICINE

## 2022-04-12 PROCEDURE — 3008F BODY MASS INDEX DOCD: CPT | Performed by: INTERNAL MEDICINE

## 2022-04-12 PROCEDURE — 1036F TOBACCO NON-USER: CPT | Performed by: INTERNAL MEDICINE

## 2022-04-12 NOTE — PROGRESS NOTES
Assessment/Plan:    Anxiety  Clinically stable and doing well continue the current medical regiment will continue monitor  Continue Zoloft working with a counselor he reports me his father is requesting possible donation from him for a kidney he has been estranged from his father for many years this is leading to a hard decision for the patient to make I have asked him to see his counselor to work through the decision making process    Family history of lupus erythematosus  Patient is asymptomatic will check double-stranded DNA, KENIA, CBC CRP and sed rate    Screening for cardiovascular condition  I have counselled the pt to follow a healthy and balanced diet ,and recommend routine exercise  Check fasting blood work including CMP and lipid profile         Problem List Items Addressed This Visit        Other    Anxiety     Clinically stable and doing well continue the current medical regiment will continue monitor  Continue Zoloft working with a counselor he reports me his father is requesting possible donation from him for a kidney he has been estranged from his father for many years this is leading to a hard decision for the patient to make I have asked him to see his counselor to work through the decision making process         Relevant Medications    sertraline (ZOLOFT) 50 mg tablet    Family history of lupus erythematosus - Primary     Patient is asymptomatic will check double-stranded DNA, KENIA, CBC CRP and sed rate         Relevant Orders    KENIA Screen w/ Reflex to Titer/Pattern    DNA (DS) ANTIBODY    Sedimentation rate, automated    C-reactive protein    Screening for cardiovascular condition     I have counselled the pt to follow a healthy and balanced diet ,and recommend routine exercise    Check fasting blood work including CMP and lipid profile         Relevant Orders    Lipid Panel with Direct LDL reflex      Other Visit Diagnoses     Screening for diabetes mellitus (DM)        Relevant Orders Comprehensive metabolic panel    Hemoglobin A1C    Screening, anemia, deficiency, iron        Relevant Orders    CBC (Includes Diff/Plt) (Refl)          RTO in 6 months call if any problems  Subjective:      Patient ID: Lily Pearson is a 42 y o  male  HPI 34-year old male coming in for a follow up visit regarding anxiety, family history of lupus, cardiovascular screening; The patient reports me compliant taking medications without untoward side effects the  The patient is here to review his medical condition, update me on the medical condition and the patient reports me no hospitalizations and no ER visits  the [facility] , recheck urine nl , 2 etoh 3/week , on zoloft 50mg /d more energy initially now plateaued ,mild depression no si working with a counselor patient reports me a dilemma he has been contacted by his father for possible kidney donation, reports me he has been estranged from his father for many years reports me that his father  his mother many years ago this is leading to anxiety he is plane to talk to his counselor regarding this  Father with lupus affecting the kidney, approved for kidney transplant,    The following portions of the patient's history were reviewed and updated as appropriate: allergies, current medications, past family history, past medical history, past social history, past surgical history and problem list     Review of Systems   Constitutional: Negative for activity change, appetite change and unexpected weight change  HENT: Negative for congestion and postnasal drip  Eyes: Negative for visual disturbance  Respiratory: Negative for cough and shortness of breath  Cardiovascular: Negative for chest pain  Gastrointestinal: Negative for abdominal pain, diarrhea, nausea and vomiting  Neurological: Negative for dizziness, light-headedness and headaches  Psychiatric/Behavioral: Negative for suicidal ideas   The patient is nervous/anxious  Objective:    No follow-ups on file  No results found  Allergies   Allergen Reactions    Sulfamethoxazole-Trimethoprim Vomiting       History reviewed  No pertinent past medical history  Past Surgical History:   Procedure Laterality Date    TYMPANOSTOMY TUBE PLACEMENT Bilateral      Current Outpatient Medications on File Prior to Visit   Medication Sig Dispense Refill    ketoconazole (NIZORAL) 2 % shampoo APPLY 1 APPLICATION TOPICALLY 2 (TWO) TIMES A WEEK 120 mL 0    desonide (DESOWEN) 0 05 % lotion Apply topically 2 (two) times a day (Patient not taking: Reported on 8/8/2019) 59 mL 0     No current facility-administered medications on file prior to visit       Family History   Problem Relation Age of Onset    Diabetes Father     Colon cancer Maternal Grandfather     Coronary artery disease Paternal Grandfather      Social History     Socioeconomic History    Marital status: Single     Spouse name: Not on file    Number of children: Not on file    Years of education: Not on file    Highest education level: Not on file   Occupational History    Not on file   Tobacco Use    Smoking status: Never Smoker    Smokeless tobacco: Never Used   Substance and Sexual Activity    Alcohol use: Yes     Comment: Social use    Drug use: No    Sexual activity: Not on file   Other Topics Concern    Not on file   Social History Narrative    Not on file     Social Determinants of Health     Financial Resource Strain: Not on file   Food Insecurity: Not on file   Transportation Needs: Not on file   Physical Activity: Not on file   Stress: Not on file   Social Connections: Not on file   Intimate Partner Violence: Not on file   Housing Stability: Not on file     Vitals:    04/12/22 1548   BP: 124/82   Pulse: 83   Resp: 16   SpO2: 97%   Weight: 88 kg (194 lb)   Height: 5' 8" (1 727 m)     Results for orders placed or performed in visit on 09/21/16   LIPID PANEL (HISTORICAL)   Result Value Ref Range    Cholesterol 193 100 - 199 mg/dL    Triglycerides 62 0 - 149 mg/dL    HDL 58 >39 mg/dL    VLDL Cholesterol Kian 12 5 - 40 mg/dL    LDL Calculated 123 (H) 0 - 99 mg/dL    Chol/HDL Ratio 3 3 0 0 - 5 0 ratio units     Weight (last 2 days)     Date/Time Weight    04/12/22 1548 88 (194)        Body mass index is 29 5 kg/m²  BP      Temp      Pulse     Resp      SpO2        Vitals:    04/12/22 1548   Weight: 88 kg (194 lb)     Vitals:    04/12/22 1548   Weight: 88 kg (194 lb)       /82   Pulse 83   Resp 16   Ht 5' 8" (1 727 m)   Wt 88 kg (194 lb)   SpO2 97%   BMI 29 50 kg/m²          Physical Exam  Constitutional:       General: He is not in acute distress  Appearance: He is well-developed  He is not diaphoretic  HENT:      Head: Normocephalic and atraumatic  Right Ear: External ear normal       Left Ear: External ear normal    Eyes:      General: No scleral icterus  Right eye: No discharge  Left eye: No discharge  Conjunctiva/sclera: Conjunctivae normal       Pupils: Pupils are equal, round, and reactive to light  Cardiovascular:      Rate and Rhythm: Normal rate and regular rhythm  Heart sounds: Normal heart sounds  No murmur heard  No friction rub  No gallop  Pulmonary:      Effort: No respiratory distress  Breath sounds: No wheezing or rales  Abdominal:      General: Bowel sounds are normal  There is no distension  Palpations: Abdomen is soft  There is no mass  Tenderness: There is no abdominal tenderness  There is no guarding or rebound  Musculoskeletal:         General: No deformity  Cervical back: Neck supple  Lymphadenopathy:      Cervical: No cervical adenopathy  Neurological:      Mental Status: He is alert  Psychiatric:         Mood and Affect: Mood is anxious  Mood is not depressed  Thought Content: Thought content does not include suicidal ideation

## 2022-04-14 PROBLEM — Z84.0 FAMILY HISTORY OF LUPUS ERYTHEMATOSUS: Status: ACTIVE | Noted: 2022-04-14

## 2022-04-14 PROBLEM — Z13.6 SCREENING FOR CARDIOVASCULAR CONDITION: Status: ACTIVE | Noted: 2022-04-14

## 2022-04-14 NOTE — ASSESSMENT & PLAN NOTE
I have counselled the pt to follow a healthy and balanced diet ,and recommend routine exercise    Check fasting blood work including CMP and lipid profile

## 2022-04-14 NOTE — ASSESSMENT & PLAN NOTE
Clinically stable and doing well continue the current medical regiment will continue monitor    Continue Zoloft working with a counselor he reports me his father is requesting possible donation from him for a kidney he has been estranged from his father for many years this is leading to a hard decision for the patient to make I have asked him to see his counselor to work through the decision making process

## 2022-10-11 PROBLEM — Z13.6 SCREENING FOR CARDIOVASCULAR CONDITION: Status: RESOLVED | Noted: 2022-04-14 | Resolved: 2022-10-11

## 2022-12-03 DIAGNOSIS — F41.9 ANXIETY: ICD-10-CM

## 2022-12-03 DIAGNOSIS — R21 RASH: ICD-10-CM

## 2022-12-05 RX ORDER — KETOCONAZOLE 20 MG/ML
1 SHAMPOO TOPICAL 2 TIMES WEEKLY
Qty: 120 ML | Refills: 0 | Status: SHIPPED | OUTPATIENT
Start: 2022-12-05

## 2022-12-24 LAB
ALBUMIN SERPL-MCNC: 4.6 G/DL (ref 4–5)
ALBUMIN/GLOB SERPL: 1.8 {RATIO} (ref 1.2–2.2)
ALP SERPL-CCNC: 48 IU/L (ref 44–121)
ALT SERPL-CCNC: 31 IU/L (ref 0–44)
ANA SER QL: NEGATIVE
AST SERPL-CCNC: 20 IU/L (ref 0–40)
BASOPHILS # BLD AUTO: 0 X10E3/UL (ref 0–0.2)
BASOPHILS NFR BLD AUTO: 0 %
BILIRUB SERPL-MCNC: <0.2 MG/DL (ref 0–1.2)
BUN SERPL-MCNC: 14 MG/DL (ref 6–20)
BUN/CREAT SERPL: 19 (ref 9–20)
CALCIUM SERPL-MCNC: 9.1 MG/DL (ref 8.7–10.2)
CHLORIDE SERPL-SCNC: 99 MMOL/L (ref 96–106)
CHOLEST SERPL-MCNC: 251 MG/DL (ref 100–199)
CO2 SERPL-SCNC: 24 MMOL/L (ref 20–29)
CREAT SERPL-MCNC: 0.75 MG/DL (ref 0.76–1.27)
CRP SERPL-MCNC: <1 MG/L (ref 0–10)
DSDNA AB SER-ACNC: 1 IU/ML (ref 0–9)
EGFR: 118 ML/MIN/1.73
EOSINOPHIL # BLD AUTO: 0.1 X10E3/UL (ref 0–0.4)
EOSINOPHIL NFR BLD AUTO: 2 %
ERYTHROCYTE [DISTWIDTH] IN BLOOD BY AUTOMATED COUNT: 13 % (ref 11.6–15.4)
ERYTHROCYTE [SEDIMENTATION RATE] IN BLOOD BY WESTERGREN METHOD: 17 MM/HR (ref 0–15)
GLOBULIN SER-MCNC: 2.6 G/DL (ref 1.5–4.5)
GLUCOSE SERPL-MCNC: 93 MG/DL (ref 70–99)
HBA1C MFR BLD: 5.6 % (ref 4.8–5.6)
HCT VFR BLD AUTO: 41 % (ref 37.5–51)
HDLC SERPL-MCNC: 60 MG/DL
HGB BLD-MCNC: 13.7 G/DL (ref 13–17.7)
IMM GRANULOCYTES # BLD: 0 X10E3/UL (ref 0–0.1)
IMM GRANULOCYTES NFR BLD: 0 %
LDLC SERPL CALC-MCNC: 171 MG/DL (ref 0–99)
LDLC/HDLC SERPL: 2.9 RATIO (ref 0–3.6)
LYMPHOCYTES # BLD AUTO: 3.1 X10E3/UL (ref 0.7–3.1)
LYMPHOCYTES NFR BLD AUTO: 46 %
MCH RBC QN AUTO: 29 PG (ref 26.6–33)
MCHC RBC AUTO-ENTMCNC: 33.4 G/DL (ref 31.5–35.7)
MCV RBC AUTO: 87 FL (ref 79–97)
MONOCYTES # BLD AUTO: 0.6 X10E3/UL (ref 0.1–0.9)
MONOCYTES NFR BLD AUTO: 8 %
NEUTROPHILS # BLD AUTO: 3 X10E3/UL (ref 1.4–7)
NEUTROPHILS NFR BLD AUTO: 44 %
PLATELET # BLD AUTO: 246 X10E3/UL (ref 150–450)
POTASSIUM SERPL-SCNC: 4.4 MMOL/L (ref 3.5–5.2)
PROT SERPL-MCNC: 7.2 G/DL (ref 6–8.5)
RBC # BLD AUTO: 4.72 X10E6/UL (ref 4.14–5.8)
SL AMB VLDL CHOLESTEROL CALC: 20 MG/DL (ref 5–40)
SODIUM SERPL-SCNC: 136 MMOL/L (ref 134–144)
TRIGL SERPL-MCNC: 111 MG/DL (ref 0–149)
WBC # BLD AUTO: 6.7 X10E3/UL (ref 3.4–10.8)

## 2022-12-28 ENCOUNTER — OFFICE VISIT (OUTPATIENT)
Dept: INTERNAL MEDICINE CLINIC | Facility: CLINIC | Age: 37
End: 2022-12-28

## 2022-12-28 VITALS
OXYGEN SATURATION: 97 % | BODY MASS INDEX: 29.8 KG/M2 | HEART RATE: 86 BPM | HEIGHT: 68 IN | RESPIRATION RATE: 16 BRPM | SYSTOLIC BLOOD PRESSURE: 118 MMHG | WEIGHT: 196.6 LBS | DIASTOLIC BLOOD PRESSURE: 78 MMHG

## 2022-12-28 DIAGNOSIS — R21 RASH: Primary | ICD-10-CM

## 2022-12-28 DIAGNOSIS — R70.0 ELEVATED SED RATE: ICD-10-CM

## 2022-12-28 DIAGNOSIS — F41.9 ANXIETY: ICD-10-CM

## 2022-12-28 DIAGNOSIS — E78.5 HYPERLIPIDEMIA, UNSPECIFIED HYPERLIPIDEMIA TYPE: ICD-10-CM

## 2022-12-28 DIAGNOSIS — Z23 NEED FOR VACCINATION: ICD-10-CM

## 2022-12-28 DIAGNOSIS — Z00.00 WELLNESS EXAMINATION: ICD-10-CM

## 2022-12-28 RX ORDER — MOMETASONE FUROATE 1 MG/G
CREAM TOPICAL DAILY PRN
Qty: 45 G | Refills: 0 | Status: SHIPPED | OUTPATIENT
Start: 2022-12-28

## 2022-12-28 RX ORDER — PAROXETINE 10 MG/1
10 TABLET, FILM COATED ORAL DAILY
Qty: 30 TABLET | Refills: 3 | Status: SHIPPED | OUTPATIENT
Start: 2022-12-28

## 2022-12-28 NOTE — PROGRESS NOTES
[facility]    NAME: Dexter Moy  AGE: 42 y o  SEX: male  : 1985     DATE: 2023     Assessment and Plan:     Problem List Items Addressed This Visit        Musculoskeletal and Integument    Rash - Primary     Nonpruritic rash of the upper torso and arms, has more eruption of rash last winter around this time diagnosed with psoriasis  Dermatology; at this point time his symptoms are compatible with psoriasis versus contact dermatitis Rx for Elocon 0 1% cream apply to affected area once a day as needed we will have patient see dermatology  Relevant Medications    mometasone (ELOCON) 0 1 % cream    Other Relevant Orders    Ambulatory Referral to Dermatology       Other    Anxiety     Clinically stable and doing well continue the current medical regiment will continue monitor  Doing well continue Paxil 10 mg once daily patient does see a counselor no SI         Relevant Medications    PARoxetine (PAXIL) 10 mg tablet    Wellness examination     Assessment and plan 1  Health maintenance annual wellness examination overall the patient is clinically stable and doing well, we encouraged the patient to follow a healthy and balanced diet  We recommend that the patient exercise routinely approximately 30 minutes 5 times per week   We have reviewed the patient's vaccines and have made recommendations for updates if necessary influenza vaccine      We will be ordering screening laboratories which are age appropriate  Return to the office in   6 months   call if any problems           Hyperlipidemia      counseled patient to follow low-cholesterol diet we will have a low threshold for starting a statin there is a family history of cardiovascular disease we will check lipid profile and high-sensitivity CRP in 6 months         Relevant Orders    Lipid Panel with Direct LDL reflex    High sensitivity CRP    Elevated sed rate Likely related to recent viral illness that did resolve and recheck ESR in 6 months         Relevant Orders    Sedimentation rate, automated   Other Visit Diagnoses     Need for vaccination        Relevant Orders    influenza vaccine, quadrivalent, 0 5 mL, preservative-free, for adult and pediatric patients 6 mos+ (AFLURIA, FLUARIX, FLULAVAL, FLUZONE) (Completed)      little red dots not itcy upper chest and right arm , flu like sx 2 weeks ago  No new environmental changes , reports in past had eruptive psoriasis ,     Immunizations and preventive care screenings were discussed with patient today  Appropriate education was printed on patient's after visit summary  Counseling:  Exercise: the importance of regular exercise/physical activity was discussed  Recommend exercise 3-5 times per week for at least 30 minutes  BMI Counseling: Body mass index is 29 89 kg/m²  The BMI is above normal  Nutrition recommendations include decreasing portion sizes and moderation in carbohydrate intake  Exercise recommendations include exercising 3-5 times per week  Rationale for BMI follow-up plan is due to patient being overweight or obese  Depression Screening and Follow-up Plan: Patient was screened for depression during today's encounter  They screened negative with a PHQ-2 score of 0  Return in about 6 months (around 6/28/2023)  Chief Complaint:     Chief Complaint   Patient presents with   • Annual Exam     Discuss Sertraline   • Rash     Started about a week ago, noticed on chest and right upper arm      History of Present Illness:     Adult Annual Physical   Patient here for a comprehensive physical exam  The patient reports problems - rash  Diet and Physical Activity  Diet/Nutrition: well balanced diet  Exercise: walking   1-2 mil/day   Depression Screening  PHQ-2/9 Depression Screening    Little interest or pleasure in doing things: 0 - not at all  Feeling down, depressed, or hopeless: 0 - not at all  PHQ-2 Score: 0  PHQ-2 Interpretation: Negative depression screen       General Health  Sleep: gets 7-8 hours of sleep on average  Hearing: normal - bilateral   Vision: goes for regular eye exams  Dental: regular dental visits   Health  History of STDs?: no      Review of Systems:     Review of Systems   Constitutional: Negative for activity change, appetite change and unexpected weight change  HENT: Negative for congestion and postnasal drip  Eyes: Negative for visual disturbance  Respiratory: Negative for cough and shortness of breath  Cardiovascular: Negative for chest pain  Gastrointestinal: Negative for abdominal pain, diarrhea, nausea and vomiting  Neurological: Negative for dizziness, light-headedness and headaches  Hematological: Negative for adenopathy  Past Medical History:     History reviewed  No pertinent past medical history     Past Surgical History:     Past Surgical History:   Procedure Laterality Date   • TYMPANOSTOMY TUBE PLACEMENT Bilateral       Social History:     Social History     Socioeconomic History   • Marital status: Single     Spouse name: None   • Number of children: None   • Years of education: None   • Highest education level: None   Occupational History   • None   Tobacco Use   • Smoking status: Never   • Smokeless tobacco: Never   Substance and Sexual Activity   • Alcohol use: Yes     Comment: Social use   • Drug use: No   • Sexual activity: None   Other Topics Concern   • None   Social History Narrative   • None     Social Determinants of Health     Financial Resource Strain: Not on file   Food Insecurity: Not on file   Transportation Needs: Not on file   Physical Activity: Not on file   Stress: Not on file   Social Connections: Not on file   Intimate Partner Violence: Not on file   Housing Stability: Not on file      Family History:     Family History   Problem Relation Age of Onset   • Diabetes Father    • Colon cancer Maternal Grandfather • Coronary artery disease Paternal Grandfather       Current Medications:     Current Outpatient Medications   Medication Sig Dispense Refill   • ketoconazole (NIZORAL) 2 % shampoo Apply 1 application topically 2 (two) times a week 120 mL 0   • mometasone (ELOCON) 0 1 % cream Apply topically daily as needed (rash) 45 g 0   • PARoxetine (PAXIL) 10 mg tablet Take 1 tablet (10 mg total) by mouth daily 30 tablet 3   • desonide (DESOWEN) 0 05 % lotion Apply topically 2 (two) times a day (Patient not taking: Reported on 8/8/2019) 59 mL 0     No current facility-administered medications for this visit  Allergies: Allergies   Allergen Reactions   • Sulfamethoxazole-Trimethoprim Vomiting      Physical Exam:     /78   Pulse 86   Resp 16   Ht 5' 8" (1 727 m)   Wt 89 2 kg (196 lb 9 6 oz)   SpO2 97%   BMI 29 89 kg/m²     Physical Exam  Constitutional:       Appearance: Normal appearance  He is well-developed and normal weight  HENT:      Head: Normocephalic and atraumatic  Right Ear: External ear normal       Left Ear: External ear normal       Nose: Nose normal    Eyes:      Pupils: Pupils are equal, round, and reactive to light  Cardiovascular:      Rate and Rhythm: Normal rate and regular rhythm  Heart sounds: Normal heart sounds  No murmur heard  Pulmonary:      Effort: Pulmonary effort is normal       Breath sounds: Normal breath sounds  Abdominal:      General: There is no distension  Palpations: Abdomen is soft  Tenderness: There is no abdominal tenderness  There is no guarding  Neurological:      Mental Status: He is alert            Mireille Cano DO   [facility]

## 2022-12-28 NOTE — PATIENT INSTRUCTIONS
Please take Zoloft 50 mg 1 tablet a day x1 week, half a tablet per day for 1 week; then discontinue x3 days and start Paxil 10 mg once daily

## 2023-01-01 PROBLEM — R70.0 ELEVATED SED RATE: Status: ACTIVE | Noted: 2023-01-01

## 2023-01-01 PROBLEM — E78.5 HYPERLIPIDEMIA: Status: ACTIVE | Noted: 2023-01-01

## 2023-01-01 NOTE — PROGRESS NOTES
Assessment/Plan:    Wellness examination  Assessment and plan 1  Health maintenance annual wellness examination overall the patient is clinically stable and doing well, we encouraged the patient to follow a healthy and balanced diet  We recommend that the patient exercise routinely approximately 30 minutes 5 times per week   We have reviewed the patient's vaccines and have made recommendations for updates if necessary influenza vaccine      We will be ordering screening laboratories which are age appropriate  Return to the office in   6 months   call if any problems  Rash  Nonpruritic rash of the upper torso and arms, has more eruption of rash last winter around this time diagnosed with psoriasis  Dermatology; at this point time his symptoms are compatible with psoriasis versus contact dermatitis Rx for Elocon 0 1% cream apply to affected area once a day as needed we will have patient see dermatology  Anxiety  Clinically stable and doing well continue the current medical regiment will continue monitor  Doing well continue Paxil 10 mg once daily patient does see a counselor no SI    Elevated sed rate  Likely related to recent viral illness that did resolve and recheck ESR in 6 months    Hyperlipidemia   counseled patient to follow low-cholesterol diet we will have a low threshold for starting a statin there is a family history of cardiovascular disease we will check lipid profile and high-sensitivity CRP in 6 months         Problem List Items Addressed This Visit        Musculoskeletal and Integument    Rash - Primary     Nonpruritic rash of the upper torso and arms, has more eruption of rash last winter around this time diagnosed with psoriasis  Dermatology; at this point time his symptoms are compatible with psoriasis versus contact dermatitis Rx for Elocon 0 1% cream apply to affected area once a day as needed we will have patient see dermatology           Relevant Medications    mometasone (ELOCON) 0 1 % cream    Other Relevant Orders    Ambulatory Referral to Dermatology       Other    Anxiety     Clinically stable and doing well continue the current medical regiment will continue monitor  Doing well continue Paxil 10 mg once daily patient does see a counselor no SI         Relevant Medications    PARoxetine (PAXIL) 10 mg tablet    Wellness examination     Assessment and plan 1  Health maintenance annual wellness examination overall the patient is clinically stable and doing well, we encouraged the patient to follow a healthy and balanced diet  We recommend that the patient exercise routinely approximately 30 minutes 5 times per week   We have reviewed the patient's vaccines and have made recommendations for updates if necessary influenza vaccine      We will be ordering screening laboratories which are age appropriate  Return to the office in   6 months   call if any problems  Hyperlipidemia      counseled patient to follow low-cholesterol diet we will have a low threshold for starting a statin there is a family history of cardiovascular disease we will check lipid profile and high-sensitivity CRP in 6 months         Relevant Orders    Lipid Panel with Direct LDL reflex    High sensitivity CRP    Elevated sed rate     Likely related to recent viral illness that did resolve and recheck ESR in 6 months         Relevant Orders    Sedimentation rate, automated   Other Visit Diagnoses     Need for vaccination        Relevant Orders    influenza vaccine, quadrivalent, 0 5 mL, preservative-free, for adult and pediatric patients 6 mos+ (AFLURIA, FLUARIX, FLULAVAL, FLUZONE) (Completed)          Return to office 6  months  call if any problems  Subjective:      Patient ID: Neela Ward is a 42 y o  male      HPI 34-year old male coming in for a follow up visit regarding upper torso rash, hyperlipidemia, elevated sed rate, anxiety; the patient reports me compliant taking medications without untoward side effects the  The patient is here to review his medical condition, update me on the medical condition and the patient reports me no hospitalizations and no ER visits  No injuries no illnesses overall is doing well he has noticed a rash to the upper torso and arms bilaterally reports me he had similar rash last year and had seen a dermatologist and diagnosed with psoriasis  The rash is non-itchy is very mild there are no changes in his environment no new products that he is started  Reports me a mild virus prior to the laboratories that may have accounted for the elevated sed rate reports anxiety levels are stable on Paxil  Try to follow healthy and balanced diet work is going well he remains active  Here to review laboratories in detail  The following portions of the patient's history were reviewed and updated as appropriate: allergies, current medications, past family history, past medical history, past social history, past surgical history and problem list     Review of Systems   Constitutional: Negative for activity change, appetite change and unexpected weight change  HENT: Negative for congestion and postnasal drip  Eyes: Negative for visual disturbance  Respiratory: Negative for cough and shortness of breath  Cardiovascular: Negative for chest pain  Gastrointestinal: Negative for abdominal pain, diarrhea, nausea and vomiting  Neurological: Negative for dizziness, light-headedness and headaches  Rash    Objective:    Return in about 6 months (around 6/28/2023)  No results found  Allergies   Allergen Reactions   • Sulfamethoxazole-Trimethoprim Vomiting       History reviewed  No pertinent past medical history    Past Surgical History:   Procedure Laterality Date   • TYMPANOSTOMY TUBE PLACEMENT Bilateral      Current Outpatient Medications on File Prior to Visit   Medication Sig Dispense Refill   • ketoconazole (NIZORAL) 2 % shampoo Apply 1 application topically 2 (two) times a week 120 mL 0   • desonide (DESOWEN) 0 05 % lotion Apply topically 2 (two) times a day (Patient not taking: Reported on 8/8/2019) 59 mL 0     No current facility-administered medications on file prior to visit       Family History   Problem Relation Age of Onset   • Diabetes Father    • Colon cancer Maternal Grandfather    • Coronary artery disease Paternal Grandfather      Social History     Socioeconomic History   • Marital status: Single     Spouse name: Not on file   • Number of children: Not on file   • Years of education: Not on file   • Highest education level: Not on file   Occupational History   • Not on file   Tobacco Use   • Smoking status: Never   • Smokeless tobacco: Never   Substance and Sexual Activity   • Alcohol use: Yes     Comment: Social use   • Drug use: No   • Sexual activity: Not on file   Other Topics Concern   • Not on file   Social History Narrative   • Not on file     Social Determinants of Health     Financial Resource Strain: Not on file   Food Insecurity: Not on file   Transportation Needs: Not on file   Physical Activity: Not on file   Stress: Not on file   Social Connections: Not on file   Intimate Partner Violence: Not on file   Housing Stability: Not on file     Vitals:    12/28/22 1237   BP: 118/78   Pulse: 86   Resp: 16   SpO2: 97%   Weight: 89 2 kg (196 lb 9 6 oz)   Height: 5' 8" (1 727 m)     Results for orders placed or performed in visit on 12/23/22   CBC and differential   Result Value Ref Range    White Blood Cell Count 6 7 3 4 - 10 8 x10E3/uL    Red Blood Cell Count 4 72 4 14 - 5 80 x10E6/uL    Hemoglobin 13 7 13 0 - 17 7 g/dL    HCT 41 0 37 5 - 51 0 %    MCV 87 79 - 97 fL    MCH 29 0 26 6 - 33 0 pg    MCHC 33 4 31 5 - 35 7 g/dL    RDW 13 0 11 6 - 15 4 %    Platelet Count 055 938 - 450 x10E3/uL    Neutrophils 44 Not Estab  %    Lymphocytes 46 Not Estab  %    Monocytes 8 Not Estab  %    Eosinophils 2 Not Estab  %    Basophils PCT 0 Not Estab  %    Neutrophils (Absolute) 3 0 1 4 - 7 0 x10E3/uL    Lymphocytes (Absolute) 3 1 0 7 - 3 1 x10E3/uL    Monocytes (Absolute) 0 6 0 1 - 0 9 x10E3/uL    Eosinophils (Absolute) 0 1 0 0 - 0 4 x10E3/uL    Basophils ABS 0 0 0 0 - 0 2 x10E3/uL    Immature Granulocytes 0 Not Estab  %    Immature Granulocytes (Absolute) 0 0 0 0 - 0 1 x10E3/uL   Comprehensive metabolic panel   Result Value Ref Range    Glucose, Random 93 70 - 99 mg/dL    BUN 14 6 - 20 mg/dL    Creatinine 0 75 (L) 0 76 - 1 27 mg/dL    eGFR 118 >59 mL/min/1 73    SL AMB BUN/CREATININE RATIO 19 9 - 20    Sodium 136 134 - 144 mmol/L    Potassium 4 4 3 5 - 5 2 mmol/L    Chloride 99 96 - 106 mmol/L    CO2 24 20 - 29 mmol/L    CALCIUM 9 1 8 7 - 10 2 mg/dL    Protein, Total 7 2 6 0 - 8 5 g/dL    Albumin 4 6 4 0 - 5 0 g/dL    Globulin, Total 2 6 1 5 - 4 5 g/dL    Albumin/Globulin Ratio 1 8 1 2 - 2 2    TOTAL BILIRUBIN <0 2 0 0 - 1 2 mg/dL    Alk Phos Isoenzymes 48 44 - 121 IU/L    AST 20 0 - 40 IU/L    ALT 31 0 - 44 IU/L   Lipid Panel with Direct LDL reflex   Result Value Ref Range    Cholesterol, Total 251 (H) 100 - 199 mg/dL    Triglycerides 111 0 - 149 mg/dL    HDL 60 >39 mg/dL    VLDL Cholesterol Calculated 20 5 - 40 mg/dL    LDL Calculated 171 (H) 0 - 99 mg/dL    LDl/HDL Ratio 2 9 0 0 - 3 6 ratio   Hemoglobin A1c (w/out EAG)   Result Value Ref Range    Hemoglobin A1C 5 6 4 8 - 5 6 %   Anti-DNA antibody, double-stranded   Result Value Ref Range    ds DNA Ab 1 0 - 9 IU/mL   KENIA w/Reflex   Result Value Ref Range    KENIA Negative Negative   Sedimentation rate, automated   Result Value Ref Range    Sedimentation Rate 17 (H) 0 - 15 mm/hr   C-reactive protein   Result Value Ref Range    C-Reactive Protein, Quant <1 0 - 10 mg/L     Weight (last 2 days)     None        Body mass index is 29 89 kg/m²    BP      Temp      Pulse     Resp      SpO2        Vitals:    12/28/22 1237   Weight: 89 2 kg (196 lb 9 6 oz)     Vitals:    12/28/22 1237   Weight: 89 2 kg (196 lb 9 6 oz)       /78   Pulse 86   Resp 16    5' 8" (1 727 m)   Wt 89 2 kg (196 lb 9 6 oz)   SpO2 97%   BMI 29 89 kg/m²          Physical Exam  Vitals and nursing note reviewed  Constitutional:       General: He is not in acute distress  Appearance: He is well-developed  He is not ill-appearing or diaphoretic  HENT:      Head: Normocephalic and atraumatic  Right Ear: External ear normal       Left Ear: External ear normal    Eyes:      General: No scleral icterus  Right eye: No discharge  Left eye: No discharge  Conjunctiva/sclera: Conjunctivae normal       Pupils: Pupils are equal, round, and reactive to light  Cardiovascular:      Rate and Rhythm: Normal rate and regular rhythm  Heart sounds: Normal heart sounds  No murmur heard  No friction rub  No gallop  Pulmonary:      Effort: No respiratory distress  Breath sounds: No wheezing or rales  Abdominal:      General: Bowel sounds are normal  There is no distension  Palpations: Abdomen is soft  There is no mass  Tenderness: There is no abdominal tenderness  There is no guarding or rebound  Musculoskeletal:         General: No deformity  Cervical back: Neck supple  Lymphadenopathy:      Cervical: No cervical adenopathy  Neurological:      Mental Status: He is alert  Psychiatric:         Mood and Affect: Mood is not anxious or depressed  Thought Content: Thought content does not include suicidal ideation         Mild erythematous eruptive skin lesions of the upper chest/torso and upper proximal arm consistent with dermatitis versus psoriatic lesions

## 2023-01-01 NOTE — ASSESSMENT & PLAN NOTE
Assessment and plan 1  Health maintenance annual wellness examination overall the patient is clinically stable and doing well, we encouraged the patient to follow a healthy and balanced diet  We recommend that the patient exercise routinely approximately 30 minutes 5 times per week   We have reviewed the patient's vaccines and have made recommendations for updates if necessary influenza vaccine      We will be ordering screening laboratories which are age appropriate  Return to the office in   6 months   call if any problems

## 2023-01-01 NOTE — ASSESSMENT & PLAN NOTE
Clinically stable and doing well continue the current medical regiment will continue monitor    Doing well continue Paxil 10 mg once daily patient does see a counselor no SI

## 2023-01-01 NOTE — ASSESSMENT & PLAN NOTE
Nonpruritic rash of the upper torso and arms, has more eruption of rash last winter around this time diagnosed with psoriasis  Dermatology; at this point time his symptoms are compatible with psoriasis versus contact dermatitis Rx for Elocon 0 1% cream apply to affected area once a day as needed we will have patient see dermatology

## 2023-01-01 NOTE — ASSESSMENT & PLAN NOTE
counseled patient to follow low-cholesterol diet we will have a low threshold for starting a statin there is a family history of cardiovascular disease we will check lipid profile and high-sensitivity CRP in 6 months

## 2023-02-20 DIAGNOSIS — F41.9 ANXIETY: Primary | ICD-10-CM

## 2023-02-20 RX ORDER — PAROXETINE HYDROCHLORIDE 20 MG/1
20 TABLET, FILM COATED ORAL DAILY
Qty: 30 TABLET | Refills: 3 | Status: SHIPPED | OUTPATIENT
Start: 2023-02-20 | End: 2023-02-22 | Stop reason: SDUPTHER

## 2023-02-22 DIAGNOSIS — F41.9 ANXIETY: ICD-10-CM

## 2023-02-22 RX ORDER — PAROXETINE HYDROCHLORIDE 20 MG/1
20 TABLET, FILM COATED ORAL DAILY
Qty: 30 TABLET | Refills: 3 | Status: SHIPPED | OUTPATIENT
Start: 2023-02-22

## 2023-02-23 ENCOUNTER — APPOINTMENT (RX ONLY)
Dept: URBAN - METROPOLITAN AREA CLINIC 23 | Facility: CLINIC | Age: 38
Setting detail: DERMATOLOGY
End: 2023-02-23

## 2023-02-23 DIAGNOSIS — L40.4 GUTTATE PSORIASIS: ICD-10-CM

## 2023-02-23 DIAGNOSIS — L21.8 OTHER SEBORRHEIC DERMATITIS: ICD-10-CM

## 2023-02-23 PROCEDURE — ? PRESCRIPTION MEDICATION MANAGEMENT

## 2023-02-23 PROCEDURE — 99203 OFFICE O/P NEW LOW 30 MIN: CPT | Mod: 25

## 2023-02-23 PROCEDURE — ? COUNSELING

## 2023-02-23 PROCEDURE — ? INTRAMUSCULAR KENALOG

## 2023-02-23 PROCEDURE — ? PRESCRIPTION

## 2023-02-23 PROCEDURE — ? ADDITIONAL NOTES

## 2023-02-23 PROCEDURE — 96372 THER/PROPH/DIAG INJ SC/IM: CPT

## 2023-02-23 PROCEDURE — ? PHOTO-DOCUMENTATION

## 2023-02-23 RX ORDER — KETOCONAZOLE 20 MG/ML
SHAMPOO, SUSPENSION TOPICAL QDAY
Qty: 120 | Refills: 3 | Status: ERX | COMMUNITY
Start: 2023-02-23

## 2023-02-23 RX ADMIN — KETOCONAZOLE: 20 SHAMPOO, SUSPENSION TOPICAL at 00:00

## 2023-02-23 ASSESSMENT — LOCATION SIMPLE DESCRIPTION DERM
LOCATION SIMPLE: CHEST
LOCATION SIMPLE: GLABELLA
LOCATION SIMPLE: RIGHT CHEEK
LOCATION SIMPLE: LEFT CHEEK
LOCATION SIMPLE: LEFT UPPER ARM
LOCATION SIMPLE: CHIN
LOCATION SIMPLE: LEFT SHOULDER
LOCATION SIMPLE: RIGHT UPPER BACK
LOCATION SIMPLE: LEFT THIGH
LOCATION SIMPLE: RIGHT ELBOW
LOCATION SIMPLE: SCALP
LOCATION SIMPLE: RIGHT THIGH

## 2023-02-23 ASSESSMENT — LOCATION ZONE DERM
LOCATION ZONE: FACE
LOCATION ZONE: TRUNK
LOCATION ZONE: SCALP
LOCATION ZONE: LEG
LOCATION ZONE: ARM

## 2023-02-23 ASSESSMENT — LOCATION DETAILED DESCRIPTION DERM
LOCATION DETAILED: RIGHT ANTECUBITAL SKIN
LOCATION DETAILED: RIGHT INFERIOR MEDIAL MALAR CHEEK
LOCATION DETAILED: LEFT LATERAL SHOULDER
LOCATION DETAILED: LEFT INFERIOR MEDIAL MALAR CHEEK
LOCATION DETAILED: LEFT SUPERIOR PARIETAL SCALP
LOCATION DETAILED: LEFT ANTERIOR DISTAL UPPER ARM
LOCATION DETAILED: LEFT CHIN
LOCATION DETAILED: LEFT ANTERIOR DISTAL THIGH
LOCATION DETAILED: RIGHT MEDIAL UPPER BACK
LOCATION DETAILED: RIGHT MEDIAL INFERIOR CHEST
LOCATION DETAILED: RIGHT ANTERIOR DISTAL THIGH
LOCATION DETAILED: GLABELLA

## 2023-02-23 NOTE — HPI: RASH
What Type Of Note Output Would You Prefer (Optional)?: Bullet Format
How Severe Is Your Rash?: mild
Is This A New Presentation, Or A Follow-Up?: Rash
Additional History: Reports previous derm diagnosed a similar rash in 2019, called it “eruptive psoriasis”, reports an injection cleared up the rash in a few weeks. Has not recurred until a couple months ago.

## 2023-02-23 NOTE — PROCEDURE: ADDITIONAL NOTES
Render Risk Assessment In Note?: no
Detail Level: Detailed
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 Should follow up if rash is unresponsive or recurs.

## 2023-02-23 NOTE — PROCEDURE: PRESCRIPTION MEDICATION MANAGEMENT
Initiate Treatment: ketoconazole 2 % shampoo: Apply to face and scalp let sit for 10-15 minutes then rinse. Use 3-5x per week as needed during flares or 2x per week for maintenance.
Detail Level: Simple
Render In Strict Bullet Format?: No

## 2023-07-23 DIAGNOSIS — F41.9 ANXIETY: ICD-10-CM

## 2023-07-26 RX ORDER — PAROXETINE HYDROCHLORIDE 20 MG/1
20 TABLET, FILM COATED ORAL DAILY
Qty: 30 TABLET | Refills: 0 | Status: SHIPPED | OUTPATIENT
Start: 2023-07-26

## 2023-11-21 LAB
CHOLEST SERPL-MCNC: 226 MG/DL (ref 100–199)
CRP SERPL-MCNC: <1 MG/L (ref 0–10)
ERYTHROCYTE [SEDIMENTATION RATE] IN BLOOD BY WESTERGREN METHOD: 3 MM/HR (ref 0–15)
HDLC SERPL-MCNC: 58 MG/DL
LDLC SERPL CALC-MCNC: 153 MG/DL (ref 0–99)
SL AMB VLDL CHOLESTEROL CALC: 15 MG/DL (ref 5–40)
TRIGL SERPL-MCNC: 84 MG/DL (ref 0–149)

## 2023-11-22 ENCOUNTER — OFFICE VISIT (OUTPATIENT)
Dept: INTERNAL MEDICINE CLINIC | Facility: CLINIC | Age: 38
End: 2023-11-22
Payer: COMMERCIAL

## 2023-11-22 VITALS
HEIGHT: 68 IN | SYSTOLIC BLOOD PRESSURE: 124 MMHG | OXYGEN SATURATION: 99 % | HEART RATE: 74 BPM | DIASTOLIC BLOOD PRESSURE: 76 MMHG | WEIGHT: 192.4 LBS | BODY MASS INDEX: 29.16 KG/M2

## 2023-11-22 DIAGNOSIS — F41.9 ANXIETY: ICD-10-CM

## 2023-11-22 DIAGNOSIS — E78.5 HYPERLIPIDEMIA, UNSPECIFIED HYPERLIPIDEMIA TYPE: ICD-10-CM

## 2023-11-22 DIAGNOSIS — Z23 NEED FOR VACCINATION: Primary | ICD-10-CM

## 2023-11-22 DIAGNOSIS — F32.1 CURRENT MODERATE EPISODE OF MAJOR DEPRESSIVE DISORDER WITHOUT PRIOR EPISODE (HCC): ICD-10-CM

## 2023-11-22 DIAGNOSIS — Z13.6 SCREENING FOR CARDIOVASCULAR CONDITION: ICD-10-CM

## 2023-11-22 PROCEDURE — 99214 OFFICE O/P EST MOD 30 MIN: CPT | Performed by: INTERNAL MEDICINE

## 2023-11-22 RX ORDER — PAROXETINE HYDROCHLORIDE 20 MG/1
20 TABLET, FILM COATED ORAL DAILY
Qty: 30 TABLET | Refills: 1 | Status: SHIPPED | OUTPATIENT
Start: 2023-11-22

## 2023-11-22 NOTE — PROGRESS NOTES
Assessment/Plan:    Current moderate episode of major depressive disorder (HCC)  Moderate depression no SI reports me several days earlier thoughts of death but would never act on these thoughts no SI he has agreed that if he develops any further suicidal thoughts he will notify us immediately for higher level of care. I did discuss possibly attending the innovations program at this point time he would like to hold off we will start Paxil 20 mg half a tablet once a day for a week and then increase to a full tablet once daily if tolerating patient has had this medicine in the past and tolerates very well and it has been effective in the past..  RTO in 2 weeks call if any problems I did ask him to contact his counselor I would like him to work with his counselor he has agreed. Anxiety  Moderate no SI will start Paxil did advise patient to start working with a counselor    Hyperlipidemia  Low-cholesterol diet check lipid profile routine exercise         Problem List Items Addressed This Visit        Other    Anxiety     Moderate no SI will start Paxil did advise patient to start working with a counselor         Relevant Medications    PARoxetine (PAXIL) 20 mg tablet    Hyperlipidemia     Low-cholesterol diet check lipid profile routine exercise         Current moderate episode of major depressive disorder (HCC)     Moderate depression no SI reports me several days earlier thoughts of death but would never act on these thoughts no SI he has agreed that if he develops any further suicidal thoughts he will notify us immediately for higher level of care.   I did discuss possibly attending the innovations program at this point time he would like to hold off we will start Paxil 20 mg half a tablet once a day for a week and then increase to a full tablet once daily if tolerating patient has had this medicine in the past and tolerates very well and it has been effective in the past..  RTO in 2 weeks call if any problems I did ask him to contact his counselor I would like him to work with his counselor he has agreed. Relevant Medications    PARoxetine (PAXIL) 20 mg tablet   Other Visit Diagnoses     Need for vaccination    -  Primary    Screening for cardiovascular condition        Relevant Orders    Lipid Panel with Direct LDL reflex    Comprehensive metabolic panel          Length of visit 30 minutes RTO in 2 weeks; supportive counseling provided, documentation treatment plan reviewed treatment options reviewed  Subjective:      Patient ID: Andrew Grajeda is a 34 y.o. male. HPI 36-year old male coming in for a follow up visit regarding next, depression, hyperlipidemia; the patient reports me compliant taking medications without untoward side effects the. The patient is here to review his medical condition, update me on the medical condition and the patient reports me no hospitalizations and no ER visits. Patient does report to me legal issue in July; he is working with the authorities, the patient also reports me that he has a . Because of the situation he reports me symptoms of,anxiety, depression  some thought of suide no plan would nevere act on the thoughts he reports, overwhelming he is journeling , walking the dogs , friends are supportive  The pt saw derm  PHQ-2/9 Depression Screening    Little interest or pleasure in doing things: 1 - several days  Feeling down, depressed, or hopeless: 1 - several days  Trouble falling or staying asleep, or sleeping too much: 2 - more than half the days  Feeling tired or having little energy: 1 - several days  Poor appetite or overeatin - not at all  Feeling bad about yourself - or that you are a failure or have let yourself or your family down: 2 - more than half the days  Trouble concentrating on things, such as reading the newspaper or watching television: 2 - more than half the days  Moving or speaking so slowly that other people could have noticed.  Or the opposite - being so fidgety or restless that you have been moving around a lot more than usual: 0 - not at all  Thoughts that you would be better off dead, or of hurting yourself in some way: 1 - several days  PHQ-2 Score: 2  PHQ-2 Interpretation: Negative depression screen  PHQ-9 Score: 10   PHQ-9 Interpretation: Moderate depression         SPRING-7 Flowsheet Screening    Flowsheet Row Most Recent Value   Over the last 2 weeks, how often have you been bothered by any of the following problems? Feeling nervous, anxious, or on edge 3   Not being able to stop or control worrying 3   Worrying too much about different things 3   Trouble relaxing 3   Being so restless that it is hard to sit still 0   Becoming easily annoyed or irritable 0   Feeling afraid as if something awful might happen 2   SPRING-7 Total Score 14         The following portions of the patient's history were reviewed and updated as appropriate: allergies, current medications, past family history, past medical history, past social history, past surgical history and problem list.    Review of Systems   Constitutional:  Negative for activity change, appetite change and unexpected weight change. HENT:  Negative for congestion and postnasal drip. Eyes:  Negative for visual disturbance. Respiratory:  Negative for cough and shortness of breath. Cardiovascular:  Negative for chest pain. Gastrointestinal:  Negative for abdominal pain, diarrhea, nausea and vomiting. Neurological:  Negative for dizziness, light-headedness and headaches. Psychiatric/Behavioral:  Negative for suicidal ideas. The patient is nervous/anxious. Depressed mood         Objective:    Return in about 2 weeks (around 12/6/2023), or if symptoms worsen or fail to improve. No results found.       Allergies   Allergen Reactions   • Sulfamethoxazole-Trimethoprim Vomiting       Past Medical History:   Diagnosis Date   • Anxiety    • Depression      Past Surgical History: Procedure Laterality Date   • TYMPANOSTOMY TUBE PLACEMENT Bilateral      Current Outpatient Medications on File Prior to Visit   Medication Sig Dispense Refill   • desonide (DESOWEN) 0.05 % lotion Apply topically 2 (two) times a day 59 mL 0   • ketoconazole (NIZORAL) 2 % shampoo Apply 1 application topically 2 (two) times a week 120 mL 0   • mometasone (ELOCON) 0.1 % cream Apply topically daily as needed (rash) 45 g 0     No current facility-administered medications on file prior to visit. Family History   Problem Relation Age of Onset   • Diabetes Father    • Autoimmune disease Father         Lupus   • Colon cancer Maternal Grandfather    • Diabetes Maternal Grandfather    • Coronary artery disease Paternal Grandfather    • Heart disease Paternal Grandfather    • Diabetes Paternal Grandfather    • Autoimmune disease Paternal Aunt         Crohn's     Social History     Socioeconomic History   • Marital status: Single     Spouse name: Not on file   • Number of children: Not on file   • Years of education: Not on file   • Highest education level: Not on file   Occupational History   • Not on file   Tobacco Use   • Smoking status: Never   • Smokeless tobacco: Never   Substance and Sexual Activity   • Alcohol use:  Yes     Alcohol/week: 8.0 standard drinks of alcohol     Types: 8 Standard drinks or equivalent per week     Comment: Social use   • Drug use: No   • Sexual activity: Yes     Partners: Male     Birth control/protection: Condom Male   Other Topics Concern   • Not on file   Social History Narrative   • Not on file     Social Determinants of Health     Financial Resource Strain: Not on file   Food Insecurity: Not on file   Transportation Needs: Not on file   Physical Activity: Not on file   Stress: Not on file   Social Connections: Not on file   Intimate Partner Violence: Not on file   Housing Stability: Not on file     Vitals:    11/22/23 0821   BP: 124/76   BP Location: Left arm   Patient Position: Sitting   Cuff Size: Large   Pulse: 74   SpO2: 99%   Weight: 87.3 kg (192 lb 6.4 oz)   Height: 5' 8" (1.727 m)     Results for orders placed or performed in visit on 11/20/23   Lipid panel   Result Value Ref Range    Cholesterol, Total 226 (H) 100 - 199 mg/dL    Triglycerides 84 0 - 149 mg/dL    HDL 58 >39 mg/dL    VLDL Cholesterol Calculated 15 5 - 40 mg/dL    LDL Calculated 153 (H) 0 - 99 mg/dL   Sedimentation rate, automated   Result Value Ref Range    Sedimentation Rate 3 0 - 15 mm/hr   C-reactive protein   Result Value Ref Range    C-Reactive Protein, Quant <1 0 - 10 mg/L     Weight (last 2 days)     None        Body mass index is 29.25 kg/m². BP      Temp      Pulse     Resp      SpO2        Vitals:    11/22/23 0821   Weight: 87.3 kg (192 lb 6.4 oz)     Vitals:    11/22/23 0821   Weight: 87.3 kg (192 lb 6.4 oz)       /76 (BP Location: Left arm, Patient Position: Sitting, Cuff Size: Large)   Pulse 74   Ht 5' 8" (1.727 m)   Wt 87.3 kg (192 lb 6.4 oz)   SpO2 99%   BMI 29.25 kg/m²          Physical Exam  Vitals and nursing note reviewed. Constitutional:       General: He is not in acute distress. Appearance: He is well-developed. He is not ill-appearing, toxic-appearing or diaphoretic. HENT:      Head: Normocephalic and atraumatic. Right Ear: External ear normal.      Left Ear: External ear normal.   Eyes:      General: No scleral icterus. Right eye: No discharge. Left eye: No discharge. Conjunctiva/sclera: Conjunctivae normal.      Pupils: Pupils are equal, round, and reactive to light. Cardiovascular:      Rate and Rhythm: Normal rate and regular rhythm. Heart sounds: Normal heart sounds. No murmur heard. No friction rub. No gallop. Pulmonary:      Effort: No respiratory distress. Breath sounds: No wheezing or rales. Abdominal:      General: Bowel sounds are normal. There is no distension. Palpations: Abdomen is soft. There is no mass. Tenderness: There is no abdominal tenderness. There is no guarding or rebound. Musculoskeletal:         General: No deformity. Cervical back: Neck supple. Lymphadenopathy:      Cervical: No cervical adenopathy. Neurological:      Mental Status: He is alert. Psychiatric:         Mood and Affect: Mood is anxious and depressed. Thought Content: Thought content does not include suicidal ideation. Thought content does not include suicidal plan.

## 2023-11-27 PROBLEM — F32.9 MAJOR DEPRESSIVE DISORDER: Status: ACTIVE | Noted: 2023-11-27

## 2023-11-27 PROBLEM — F32.1 CURRENT MODERATE EPISODE OF MAJOR DEPRESSIVE DISORDER (HCC): Status: ACTIVE | Noted: 2023-11-27

## 2023-11-27 NOTE — ASSESSMENT & PLAN NOTE
Moderate depression no SI reports me several days earlier thoughts of death but would never act on these thoughts no SI he has agreed that if he develops any further suicidal thoughts he will notify us immediately for higher level of care. I did discuss possibly attending the innovations program at this point time he would like to hold off we will start Paxil 20 mg half a tablet once a day for a week and then increase to a full tablet once daily if tolerating patient has had this medicine in the past and tolerates very well and it has been effective in the past..  RTO in 2 weeks call if any problems I did ask him to contact his counselor I would like him to work with his counselor he has agreed.

## 2023-12-18 ENCOUNTER — OFFICE VISIT (OUTPATIENT)
Dept: INTERNAL MEDICINE CLINIC | Facility: CLINIC | Age: 38
End: 2023-12-18

## 2023-12-18 VITALS
HEART RATE: 83 BPM | SYSTOLIC BLOOD PRESSURE: 120 MMHG | TEMPERATURE: 98.8 F | HEIGHT: 69 IN | OXYGEN SATURATION: 99 % | WEIGHT: 185.2 LBS | BODY MASS INDEX: 27.43 KG/M2 | DIASTOLIC BLOOD PRESSURE: 90 MMHG

## 2023-12-18 DIAGNOSIS — F32.9 MAJOR DEPRESSIVE DISORDER, REMISSION STATUS UNSPECIFIED, UNSPECIFIED WHETHER RECURRENT: Primary | ICD-10-CM

## 2023-12-18 DIAGNOSIS — F41.9 ANXIETY: ICD-10-CM

## 2023-12-18 DIAGNOSIS — F32.1 CURRENT MODERATE EPISODE OF MAJOR DEPRESSIVE DISORDER, UNSPECIFIED WHETHER RECURRENT (HCC): ICD-10-CM

## 2023-12-18 PROBLEM — Z23 FLU VACCINE NEED: Status: ACTIVE | Noted: 2023-12-18

## 2023-12-18 PROBLEM — Z23 FLU VACCINE NEED: Status: RESOLVED | Noted: 2023-12-18 | Resolved: 2023-12-18

## 2023-12-18 RX ORDER — PAROXETINE HYDROCHLORIDE 20 MG/1
40 TABLET, FILM COATED ORAL DAILY
Qty: 30 TABLET | Refills: 1 | Status: SHIPPED | OUTPATIENT
Start: 2023-12-18

## 2023-12-18 NOTE — PROGRESS NOTES
Name: Omari Daniel      : 1985      MRN: [id number]  Encounter Provider: Srinivasan Correa MD  Encounter Date: 2023   [facility]    Assessment & Plan     1. Major depressive disorder, remission status unspecified, unspecified whether recurrent  Assessment & Plan:  Pt here for follow up of depression  Started on paroxetine 20 mg two weeks back  Symptoms improving, but still has thoughts of death and lack of interest in activity  Patient reported that he will not act on thoughts of death, he has multiple stressorsin life going on  No side effects from medication,tolerating well  He has been seeing the counselor for the depression which is helping  Plan  Increase paroxetine dose to 40mg   Follow up in one month  Continue counseling      2. Current moderate episode of major depressive disorder, unspecified whether recurrent ([facility])    3. Anxiety  Assessment & Plan:  Moderate no SI , increase dose of paxil to 40mg    Continue counseling with the counselor    Orders:  -     PARoxetine (PAXIL) 20 mg tablet; Take 2 tablets (40 mg total) by mouth daily           Subjective      HPI  38 year old male with h/o hyperlipidemia ,depression and anxiety here for follow up of depression and influenza vaccinations No Patient Care Coordination Note on file.   Was started on Paxil 20mg 2 weeks back for depression and thoughts of suicide and was asked to come back for considering increasing in mediation dose if patient tolerates weel.He reports his ymptoms ahave overall improved but still has some thoughts of suicide  and feeling of heaviness , lack of concentration and lack of interest.No suicidal plans or intents.Patient reports he is tolerating the medication well, no side effects noted.He has been undergoing counseling with a counselor.    Review of Systems   Constitutional:  Positive for activity change and fatigue. Negative for chills and fever.   HENT:   "Negative for sore throat.    Eyes:  Negative for visual disturbance.   Respiratory:  Negative for cough and shortness of breath.    Cardiovascular:  Negative for chest pain and palpitations.   Gastrointestinal:  Negative for abdominal pain, constipation, diarrhea, nausea and vomiting.   Genitourinary:  Negative for frequency, hematuria and urgency.   Neurological:  Negative for headaches.   Psychiatric/Behavioral:  Positive for decreased concentration and suicidal ideas. The patient is nervous/anxious.        Current Outpatient Medications on File Prior to Visit   Medication Sig    ketoconazole (NIZORAL) 2 % shampoo Apply 1 application topically 2 (two) times a week    [DISCONTINUED] PARoxetine (PAXIL) 20 mg tablet Take 1 tablet (20 mg total) by mouth daily    desonide (DESOWEN) 0.05 % lotion Apply topically 2 (two) times a day (Patient not taking: Reported on 12/18/2023)    mometasone (ELOCON) 0.1 % cream Apply topically daily as needed (rash) (Patient not taking: Reported on 12/18/2023)       Objective     /90 (BP Location: Left arm, Patient Position: Sitting, Cuff Size: Large)   Pulse 83   Temp 98.8 °F (37.1 °C) (Probe)   Ht 5' 8.5\" (1.74 m)   Wt 84 kg (185 lb 3.2 oz)   SpO2 99%   BMI 27.75 kg/m²     Physical Exam  Constitutional:       Appearance: Normal appearance.   HENT:      Head: Normocephalic.      Right Ear: Tympanic membrane normal.      Left Ear: Tympanic membrane normal.      Nose: Nose normal.      Mouth/Throat:      Mouth: Mucous membranes are moist.   Eyes:      Extraocular Movements: Extraocular movements intact.      Conjunctiva/sclera: Conjunctivae normal.      Pupils: Pupils are equal, round, and reactive to light.   Cardiovascular:      Rate and Rhythm: Normal rate and regular rhythm.   Pulmonary:      Effort: Pulmonary effort is normal.      Breath sounds: Normal breath sounds.   Abdominal:      General: Abdomen is flat.      Palpations: Abdomen is soft.   Musculoskeletal:         " General: Normal range of motion.   Skin:     General: Skin is warm.   Neurological:      General: No focal deficit present.      Mental Status: He is alert.   Psychiatric:         Mood and Affect: Mood normal.       Srinivasan Correa MD

## 2023-12-18 NOTE — ASSESSMENT & PLAN NOTE
Pt here for follow up of depression  Started on paroxetine 20 mg two weeks back  Symptoms improving, but still has thoughts of death and lack of interest in activity  Patient reported that he will not act on thoughts of death, he has multiple stressorsin life going on  No side effects from medication,tolerating well  He has been seeing the counselor for the depression which is helping  Plan  Increase paroxetine dose to 40mg   Follow up in one month  Continue counseling

## 2023-12-18 NOTE — LETTER
December 18, 2023     Patient: Omari Daniel  YOB: 1985  Date of Visit: 12/18/2023      To Whom it May Concern:    Omari Daniel is under my professional care. Omari was seen in my office on 12/18/2023.  I have known Omari for greater than 5 years, as long as I have known Omari he has been an excellent person, hard-working and responsible.    If you have any questions or concerns, please don't hesitate to call.         Sincerely,          Jarrod Estes D.O.        CC: No Recipients

## 2024-02-15 DIAGNOSIS — F41.9 ANXIETY: ICD-10-CM

## 2024-02-15 RX ORDER — PAROXETINE HYDROCHLORIDE 20 MG/1
40 TABLET, FILM COATED ORAL DAILY
Qty: 30 TABLET | Refills: 1 | Status: SHIPPED | OUTPATIENT
Start: 2024-02-15

## 2024-02-15 NOTE — TELEPHONE ENCOUNTER
Leonides Estes. I had an appointment with you a few weeks ago but had to cancel. My Paxil needs to be refilled. I can call the office to reschedule my appointment. Thanks for your help. Take care. ~ Omari Daniel

## 2024-02-21 PROBLEM — J02.9 ACUTE PHARYNGITIS: Status: RESOLVED | Noted: 2019-08-08 | Resolved: 2024-02-21

## 2024-04-08 ENCOUNTER — OFFICE VISIT (OUTPATIENT)
Dept: INTERNAL MEDICINE CLINIC | Facility: CLINIC | Age: 39
End: 2024-04-08
Payer: COMMERCIAL

## 2024-04-08 VITALS
BODY MASS INDEX: 29.04 KG/M2 | OXYGEN SATURATION: 99 % | HEART RATE: 82 BPM | WEIGHT: 193.8 LBS | SYSTOLIC BLOOD PRESSURE: 140 MMHG | DIASTOLIC BLOOD PRESSURE: 68 MMHG

## 2024-04-08 DIAGNOSIS — Z00.00 ANNUAL PHYSICAL EXAM: Primary | ICD-10-CM

## 2024-04-08 DIAGNOSIS — E78.5 HYPERLIPIDEMIA, UNSPECIFIED HYPERLIPIDEMIA TYPE: ICD-10-CM

## 2024-04-08 DIAGNOSIS — F41.9 ANXIETY: ICD-10-CM

## 2024-04-08 DIAGNOSIS — F32.1 CURRENT MODERATE EPISODE OF MAJOR DEPRESSIVE DISORDER, UNSPECIFIED WHETHER RECURRENT (HCC): ICD-10-CM

## 2024-04-08 PROCEDURE — 99396 PREV VISIT EST AGE 40-64: CPT | Performed by: INTERNAL MEDICINE

## 2024-04-08 RX ORDER — PAROXETINE HYDROCHLORIDE 20 MG/1
40 TABLET, FILM COATED ORAL DAILY
Qty: 30 TABLET | Refills: 1 | Status: CANCELLED | OUTPATIENT
Start: 2024-04-08

## 2024-04-08 RX ORDER — PAROXETINE HYDROCHLORIDE 40 MG/1
40 TABLET, FILM COATED ORAL EVERY MORNING
Qty: 90 TABLET | Refills: 0 | Status: SHIPPED | OUTPATIENT
Start: 2024-04-08

## 2024-04-08 NOTE — ASSESSMENT & PLAN NOTE
-Patient's anxiety and depression have significantly improved.  He is no longer experiencing any suicidal ideation.  -Continue current dose of Paxil 40 mg daily.  -Plan for patient to follow-up with PCP in 4 months.

## 2024-04-08 NOTE — PROGRESS NOTES
Name: Omari Daniel      : 1985      MRN: [id number]  Encounter Provider: Ezequiel Calix MD  Encounter Date: 2024   Enc[facility] department: [facility]    Assessment & Plan     1. Annual physical exam    2. Anxiety    3. Current moderate episode of major depressive disorder, unspecified whether recurrent (HCC)           Subjective      HPI    All other systems negative except for pertinent findings noted in HPI.       Current Outpatient Medications on File Prior to Visit   Medication Sig   • ketoconazole (NIZORAL) 2 % shampoo Apply 1 application topically 2 (two) times a week   • PARoxetine (PAXIL) 20 mg tablet Take 2 tablets (40 mg total) by mouth daily   • desonide (DESOWEN) 0.05 % lotion Apply topically 2 (two) times a day (Patient not taking: Reported on 2023)   • mometasone (ELOCON) 0.1 % cream Apply topically daily as needed (rash) (Patient not taking: Reported on 2023)       Objective     /68 (BP Location: Left arm, Patient Position: Sitting, Cuff Size: Large)   Pulse 82   Wt 87.9 kg (193 lb 12.8 oz)   SpO2 99%   BMI 29.04 kg/m²     BP Readings from Last 3 Encounters:   24 140/68   23 120/90   23 124/76        Wt Readings from Last 3 Encounters:   24 87.9 kg (193 lb 12.8 oz)   23 84 kg (185 lb 3.2 oz)   23 87.3 kg (192 lb 6.4 oz)       Physical Exam    General: NAD, Alert and oriented x3   HEENT: NCAT, EOMI, normal conjunctiva  Cardiovascular: RRR, normal S1 and S2, no m/r/g  Pulmonary: Normal respiratory effort, no wheezes, rales or rhonchi  GI: Soft, nontender, nondistended, normoactive bowel sounds  MSK: Normal bulk and tone  Neuro: Non-focal, ambulating without difficulty, non-antalgic gait  Extremities: No lower extremity edema  Skin: Normal skin color, no rashes     Ezequiel Calix MD

## 2024-04-08 NOTE — PROGRESS NOTES
ADULT ANNUAL PHYSICAL  [facility] - [facility]    NAME: Omari Daniel  AGE: 39 y.o. SEX: male  : 1985     DATE: 2024     Assessment and Plan:     Problem List Items Addressed This Visit     Anxiety     -Patient's anxiety and depression have significantly improved.  He is no longer experiencing any suicidal ideation.  -Continue current dose of Paxil 40 mg daily.  -Plan for patient to follow-up with PCP in 4 months.         Relevant Medications    PARoxetine (PAXIL) 40 MG tablet    Hyperlipidemia     -Lipid panel ordered and pending          Current moderate episode of major depressive disorder (HCC)     -See plan above for anxiety          Relevant Medications    PARoxetine (PAXIL) 40 MG tablet    Other Relevant Orders    CBC and differential   Other Visit Diagnoses     Annual physical exam    -  Primary    Relevant Orders    CBC and differential          Immunizations and preventive care screenings were discussed with patient today. Appropriate education was printed on patient's after visit summary.         Return in about 4 months (around 2024).     Chief Complaint:     Chief Complaint   Patient presents with   • Follow-up      History of Present Illness:     Adult Annual Physical   Patient here for a comprehensive physical exam.  His past medical history is most notable for anxiety and depression.  He was last seen in December for management of his depression/anxiety.  At the time he was still experiencing some thoughts of suicide, feelings of heaviness, lack of concentration and lack of interest.  His Paxil dose was increased from 20 mg to 40 mg.  Since the dose increase he states he has been feeling significantly better.  He denies any SI/HI.  In addition to this the personal event that triggered his mood disturbance has resolved.         Depression Screening  PHQ-2/9 Depression Screening            Review of Systems:     Review of Systems  All  other systems negative except for pertinent findings noted in HPI.      Past Medical History:     Past Medical History:   Diagnosis Date   • Anxiety    • Depression       Past Surgical History:     Past Surgical History:   Procedure Laterality Date   • TYMPANOSTOMY TUBE PLACEMENT Bilateral       Family History:     Family History   Problem Relation Age of Onset   • Diabetes Father    • Autoimmune disease Father         Lupus   • Colon cancer Maternal Grandfather    • Diabetes Maternal Grandfather    • Coronary artery disease Paternal Grandfather    • Heart disease Paternal Grandfather    • Diabetes Paternal Grandfather    • Autoimmune disease Paternal Aunt         Crohn's      Social History:     Social History     Socioeconomic History   • Marital status: Single     Spouse name: None   • Number of children: None   • Years of education: None   • Highest education level: None   Occupational History   • None   Tobacco Use   • Smoking status: Never     Passive exposure: Never   • Smokeless tobacco: Never   Vaping Use   • Vaping status: Never Used   Substance and Sexual Activity   • Alcohol use: Yes     Alcohol/week: 8.0 standard drinks of alcohol     Types: 8 Standard drinks or equivalent per week     Comment: Social use   • Drug use: No   • Sexual activity: Yes     Partners: Male     Birth control/protection: Condom Male   Other Topics Concern   • None   Social History Narrative   • None     Social Determinants of Health     Financial Resource Strain: Not on file   Food Insecurity: Not on file   Transportation Needs: Not on file   Physical Activity: Not on file   Stress: Not on file   Social Connections: Not on file   Intimate Partner Violence: Not on file   Housing Stability: Not on file      Current Medications:     Current Outpatient Medications   Medication Sig Dispense Refill   • ketoconazole (NIZORAL) 2 % shampoo Apply 1 application topically 2 (two) times a week 120 mL 0   • PARoxetine (PAXIL) 40 MG tablet Take  1 tablet (40 mg total) by mouth every morning 90 tablet 0   • desonide (DESOWEN) 0.05 % lotion Apply topically 2 (two) times a day (Patient not taking: Reported on 12/18/2023) 59 mL 0   • mometasone (ELOCON) 0.1 % cream Apply topically daily as needed (rash) (Patient not taking: Reported on 12/18/2023) 45 g 0     No current facility-administered medications for this visit.      Allergies:     Allergies   Allergen Reactions   • Sulfamethoxazole-Trimethoprim Vomiting      Physical Exam:     /68 (BP Location: Left arm, Patient Position: Sitting, Cuff Size: Large)   Pulse 82   Wt 87.9 kg (193 lb 12.8 oz)   SpO2 99%   BMI 29.04 kg/m²     Physical Exam   General: NAD  HEENT: NCAT, EOMI, normal conjunctiva  Cardiovascular: RRR, normal S1 and S2, no m/r/g  Pulmonary: Normal respiratory effort, no wheezes, rales or rhonchi  GI: Soft, nontender, nondistended, normoactive bowel sounds  Musculoskeletal: Normal bulk and tone  Neuro: Non-focal, ambulating without difficulty, non-antalgic gait  Extremities: No lower extremity edema  Skin: Normal skin color, no rashes   Psychiatric: Normal mood and affect    Ezequiel Calix MD  [facility]

## 2024-05-23 ENCOUNTER — OFFICE VISIT (OUTPATIENT)
Dept: INTERNAL MEDICINE CLINIC | Facility: CLINIC | Age: 39
End: 2024-05-23
Payer: COMMERCIAL

## 2024-05-23 VITALS
BODY MASS INDEX: 29.63 KG/M2 | SYSTOLIC BLOOD PRESSURE: 138 MMHG | OXYGEN SATURATION: 98 % | WEIGHT: 197.8 LBS | HEART RATE: 83 BPM | DIASTOLIC BLOOD PRESSURE: 80 MMHG

## 2024-05-23 DIAGNOSIS — J02.9 SORE THROAT: Primary | ICD-10-CM

## 2024-05-23 LAB — S PYO AG THROAT QL: NEGATIVE

## 2024-05-23 PROCEDURE — 87880 STREP A ASSAY W/OPTIC: CPT | Performed by: INTERNAL MEDICINE

## 2024-05-23 PROCEDURE — 99213 OFFICE O/P EST LOW 20 MIN: CPT | Performed by: INTERNAL MEDICINE

## 2024-05-23 NOTE — PROGRESS NOTES
Name: Omari Daniel      : 1985      MRN: [id number]  Encounter Provider: Ezequiel Calix MD  Encounter Date: 2024   Encounter department: [facility]    Assessment & Plan     1. Sore throat  -     POCT rapid ANTIGEN strepA     -POC rapid strep negative  -Take Mucinex/Robitussin DM for cough and congestion  -Administer a saline nasal spray as needed for sinus congestion  -Use over the counter Flonase 1 spray in each nostril daily   -Take Tylenol or ibuprofen as needed for pain and/or fever  -Perform daily salt water gargles for sore throat    Bacterial conjunctivitis?  Right eye slightly red.  Appears to be consistent with a viral conjunctivitis however it is difficult to tell since he has been on antibiotics for the past 2 days.  He may complete the rest of his course.    Subjective      HPI  Patient presents today as an acute visit.  He complains of cough and cold symptoms over the past week and a half.  He states few days ago he was evaluated by telehealth and started on eyedrops for a presumed bacterial conjunctivitis involving his left eye.  He reports that has resolved and now moved over to his right eye so he has been on the drops again over the past 2 days.    He also complains of on and off sore throat over the same time.  In addition to throat clearing.  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      All other systems negative except for pertinent findings noted in HPI.       Current Outpatient Medications on File Prior to Visit   Medication Sig   • ketoconazole (NIZORAL) 2 % shampoo Apply 1 application topically 2 (two) times a week   • PARoxetine (PAXIL) 40 MG tablet Take 1 tablet (40 mg total) by mouth every morning   • desonide (DESOWEN) 0.05 % lotion Apply topically 2 (two) times a day (Patient not taking: Reported on 2023)   • mometasone (ELOCON) 0.1 % cream Apply topically daily as needed  (rash) (Patient not taking: Reported on 12/18/2023)       Objective     /80 (BP Location: Left arm, Patient Position: Sitting, Cuff Size: Large)   Pulse 83   Wt 89.7 kg (197 lb 12.8 oz)   SpO2 98%   BMI 29.63 kg/m²     BP Readings from Last 3 Encounters:   05/23/24 138/80   04/08/24 140/68   12/18/23 120/90        Wt Readings from Last 3 Encounters:   05/23/24 89.7 kg (197 lb 12.8 oz)   04/08/24 87.9 kg (193 lb 12.8 oz)   12/18/23 84 kg (185 lb 3.2 oz)       Physical Exam    General: NAD  HEENT: NCAT, EOMI, normal conjunctiva, mild right scleral injection  Cardiovascular: RRR, normal S1 and S2, no m/r/g  Pulmonary: Normal respiratory effort, no wheezes, rales or rhonchi  Extremities: No lower extremity edema  Skin: Normal skin color, no rashes     Ezequiel Calix MD

## 2024-05-23 NOTE — PATIENT INSTRUCTIONS
-Take Mucinex/Robitussin DM for cough and congestion  -Administer a saline nasal spray as needed for sinus congestion  -Use over the counter Flonase 1 spray in each nostril daily   -Take Tylenol or ibuprofen as needed for pain and/or fever  -Perform daily salt water gargles for sore throat

## 2024-07-08 DIAGNOSIS — F32.1 CURRENT MODERATE EPISODE OF MAJOR DEPRESSIVE DISORDER, UNSPECIFIED WHETHER RECURRENT (HCC): ICD-10-CM

## 2024-07-08 DIAGNOSIS — F41.9 ANXIETY: ICD-10-CM

## 2024-07-08 RX ORDER — PAROXETINE HYDROCHLORIDE 40 MG/1
40 TABLET, FILM COATED ORAL EVERY MORNING
Qty: 90 TABLET | Refills: 3 | Status: SHIPPED | OUTPATIENT
Start: 2024-07-08

## 2024-07-26 DIAGNOSIS — R21 RASH: ICD-10-CM

## 2024-07-26 RX ORDER — KETOCONAZOLE 20 MG/ML
1 SHAMPOO TOPICAL 2 TIMES WEEKLY
Qty: 120 ML | Refills: 0 | Status: SHIPPED | OUTPATIENT
Start: 2024-07-29

## 2024-07-26 NOTE — TELEPHONE ENCOUNTER
Refill must be reviewed and completed by the office or provider. The refill is unable to be approved or denied by the medication management team.    Off protocol

## 2025-06-25 ENCOUNTER — APPOINTMENT (OUTPATIENT)
Dept: URBAN - METROPOLITAN AREA CLINIC 26 | Facility: CLINIC | Age: 40
Setting detail: DERMATOLOGY
End: 2025-06-25

## 2025-06-25 DIAGNOSIS — L21.8 OTHER SEBORRHEIC DERMATITIS: ICD-10-CM

## 2025-06-25 DIAGNOSIS — L40.0 PSORIASIS VULGARIS: ICD-10-CM | Status: INADEQUATELY CONTROLLED

## 2025-06-25 PROCEDURE — ? PRESCRIPTION MEDICATION MANAGEMENT

## 2025-06-25 PROCEDURE — ? TREATMENT GOALS

## 2025-06-25 PROCEDURE — ? PRESCRIPTION

## 2025-06-25 PROCEDURE — ? COUNSELING

## 2025-06-25 RX ORDER — BETAMETHASONE DIPROPIONATE 0.5 MG/G
1 CREAM TOPICAL BID
Qty: 45 | Refills: 3 | Status: ERX | COMMUNITY
Start: 2025-06-25

## 2025-06-25 RX ORDER — KETOCONAZOLE 20 MG/ML
SHAMPOO, SUSPENSION TOPICAL QDAY
Qty: 120 | Refills: 3 | Status: ERX

## 2025-06-25 RX ORDER — TACROLIMUS 1 MG/G
1 OINTMENT TOPICAL BID
Qty: 60 | Refills: 3 | Status: ERX | COMMUNITY
Start: 2025-06-25

## 2025-06-25 RX ADMIN — BETAMETHASONE DIPROPIONATE 1: 0.5 CREAM TOPICAL at 00:00

## 2025-06-25 RX ADMIN — TACROLIMUS 1: 1 OINTMENT TOPICAL at 00:00

## 2025-06-25 ASSESSMENT — LOCATION DETAILED DESCRIPTION DERM
LOCATION DETAILED: RIGHT PROXIMAL PRETIBIAL REGION
LOCATION DETAILED: LEFT ANTERIOR DISTAL THIGH
LOCATION DETAILED: LEFT INFERIOR MEDIAL MALAR CHEEK
LOCATION DETAILED: LEFT CHIN
LOCATION DETAILED: RIGHT ANTECUBITAL SKIN
LOCATION DETAILED: LEFT SUPERIOR PARIETAL SCALP
LOCATION DETAILED: RIGHT MEDIAL UPPER BACK
LOCATION DETAILED: RIGHT ANTERIOR DISTAL THIGH
LOCATION DETAILED: RIGHT INFERIOR MEDIAL MALAR CHEEK
LOCATION DETAILED: LEFT ANTERIOR DISTAL UPPER ARM
LOCATION DETAILED: LEFT PROXIMAL PRETIBIAL REGION
LOCATION DETAILED: GLABELLA

## 2025-06-25 ASSESSMENT — LOCATION SIMPLE DESCRIPTION DERM
LOCATION SIMPLE: LEFT CHEEK
LOCATION SIMPLE: SCALP
LOCATION SIMPLE: RIGHT PRETIBIAL REGION
LOCATION SIMPLE: GLABELLA
LOCATION SIMPLE: RIGHT THIGH
LOCATION SIMPLE: LEFT PRETIBIAL REGION
LOCATION SIMPLE: RIGHT ELBOW
LOCATION SIMPLE: LEFT UPPER ARM
LOCATION SIMPLE: RIGHT UPPER BACK
LOCATION SIMPLE: RIGHT CHEEK
LOCATION SIMPLE: LEFT THIGH
LOCATION SIMPLE: CHIN

## 2025-06-25 ASSESSMENT — BSA PSORIASIS: % BODY COVERED IN PSORIASIS: 7

## 2025-06-25 ASSESSMENT — PGA PSORIASIS: PGA PSORIASIS 2020: MODERATE

## 2025-06-25 ASSESSMENT — LOCATION ZONE DERM
LOCATION ZONE: LEG
LOCATION ZONE: ARM
LOCATION ZONE: TRUNK
LOCATION ZONE: FACE
LOCATION ZONE: SCALP

## 2025-06-25 ASSESSMENT — ITCH NUMERIC RATING SCALE: HOW SEVERE IS YOUR ITCHING?: 3

## 2025-06-25 NOTE — PROCEDURE: PRESCRIPTION MEDICATION MANAGEMENT
Render In Strict Bullet Format?: No
Detail Level: Zone
Initiate Treatment: betamethasone dipropionate 0.05 % topical cream :Apply to affected areas on knee twice daily x 2 weeks, then discontinue for 2 weeks. May repeat PRN flares\\n\\ntacrolimus 0.1 % topical ointment :Apply to AA twice daily when not applying topical steroids
Initiate Treatment: ketoconazole 2 % shampoo: Apply to face and scalp let sit for 10-15 minutes then rinse. Use 3-5x per week as needed during flares or 2x per week for maintenance.
Detail Level: Simple
Continue Regimen: ketoconazole 2 % shampoo : Apply to face and scalp let sit for 10-15 minutes then rinse. Use 5x per week as needed during flares or 2x per week for maintenance. May use OTC shampoos for maintenance instead, then return to Rx for flares.

## 2025-07-15 DIAGNOSIS — F41.9 ANXIETY: ICD-10-CM

## 2025-07-15 DIAGNOSIS — F32.1 CURRENT MODERATE EPISODE OF MAJOR DEPRESSIVE DISORDER, UNSPECIFIED WHETHER RECURRENT (HCC): ICD-10-CM

## 2025-07-16 RX ORDER — PAROXETINE 40 MG/1
40 TABLET, FILM COATED ORAL EVERY MORNING
Qty: 30 TABLET | Refills: 0 | Status: SHIPPED | OUTPATIENT
Start: 2025-07-16

## 2025-08-03 DIAGNOSIS — F41.9 ANXIETY: ICD-10-CM

## 2025-08-03 DIAGNOSIS — F32.1 CURRENT MODERATE EPISODE OF MAJOR DEPRESSIVE DISORDER, UNSPECIFIED WHETHER RECURRENT (HCC): ICD-10-CM

## 2025-08-05 RX ORDER — PAROXETINE 40 MG/1
40 TABLET, FILM COATED ORAL EVERY MORNING
Qty: 30 TABLET | Refills: 0 | Status: SHIPPED | OUTPATIENT
Start: 2025-08-05